# Patient Record
Sex: MALE | Race: BLACK OR AFRICAN AMERICAN | NOT HISPANIC OR LATINO | Employment: OTHER
[De-identification: names, ages, dates, MRNs, and addresses within clinical notes are randomized per-mention and may not be internally consistent; named-entity substitution may affect disease eponyms.]

---

## 2021-11-09 ENCOUNTER — TRANSCRIBE ORDERS (OUTPATIENT)
Age: 61
End: 2021-11-09

## 2021-11-09 DIAGNOSIS — G47.33 SEVERE OBSTRUCTIVE SLEEP APNEA: Primary | ICD-10-CM

## 2023-02-20 PROBLEM — I35.1 SEVERE AORTIC REGURGITATION: Status: ACTIVE | Noted: 2023-02-20

## 2023-02-20 PROBLEM — E11.9 TYPE 2 DIABETES MELLITUS WITHOUT COMPLICATION, WITHOUT LONG-TERM CURRENT USE OF INSULIN (MULTI): Status: ACTIVE | Noted: 2023-02-20

## 2023-02-20 PROBLEM — E78.5 HYPERLIPIDEMIA: Status: ACTIVE | Noted: 2023-02-20

## 2023-02-20 PROBLEM — I10 HTN (HYPERTENSION), BENIGN: Status: ACTIVE | Noted: 2023-02-20

## 2023-02-20 PROBLEM — R14.2 BELCHING: Status: ACTIVE | Noted: 2023-02-20

## 2023-02-20 PROBLEM — Z95.2 S/P AORTIC VALVE REPLACEMENT: Status: ACTIVE | Noted: 2023-02-20

## 2023-02-20 PROBLEM — E66.01 MORBID OBESITY (MULTI): Status: ACTIVE | Noted: 2023-02-20

## 2023-02-20 PROBLEM — G47.33 SEVERE OBSTRUCTIVE SLEEP APNEA: Status: ACTIVE | Noted: 2023-02-20

## 2023-02-20 RX ORDER — ASPIRIN 81 MG/1
1 TABLET ORAL DAILY
COMMUNITY

## 2023-02-20 RX ORDER — ACETAMINOPHEN 325 MG/1
325 TABLET ORAL EVERY 6 HOURS PRN
COMMUNITY
Start: 2021-03-12

## 2023-02-20 RX ORDER — METOPROLOL TARTRATE 25 MG/1
1 TABLET, FILM COATED ORAL 2 TIMES DAILY
COMMUNITY
Start: 2021-03-12 | End: 2023-04-05 | Stop reason: ALTCHOICE

## 2023-02-20 RX ORDER — MULTIVIT-MIN/FA/LYCOPEN/LUTEIN .4-300-25
1 TABLET ORAL DAILY
COMMUNITY
Start: 2021-08-10

## 2023-02-20 RX ORDER — CHOLECALCIFEROL (VITAMIN D3) 50 MCG
1 TABLET ORAL DAILY
COMMUNITY

## 2023-02-20 RX ORDER — METFORMIN HYDROCHLORIDE 500 MG/1
1 TABLET ORAL DAILY
COMMUNITY
Start: 2022-07-06 | End: 2023-05-23 | Stop reason: SDUPTHER

## 2023-02-20 RX ORDER — ATORVASTATIN CALCIUM 10 MG/1
1 TABLET, FILM COATED ORAL DAILY
COMMUNITY
Start: 2022-07-06 | End: 2023-04-05 | Stop reason: SDUPTHER

## 2023-03-09 LAB
ALANINE AMINOTRANSFERASE (SGPT) (U/L) IN SER/PLAS: 41 U/L (ref 10–52)
ALBUMIN (G/DL) IN SER/PLAS: 4.5 G/DL (ref 3.4–5)
ALKALINE PHOSPHATASE (U/L) IN SER/PLAS: 92 U/L (ref 33–136)
ANION GAP IN SER/PLAS: 14 MMOL/L (ref 10–20)
ASPARTATE AMINOTRANSFERASE (SGOT) (U/L) IN SER/PLAS: 30 U/L (ref 9–39)
BILIRUBIN TOTAL (MG/DL) IN SER/PLAS: 0.7 MG/DL (ref 0–1.2)
CALCIUM (MG/DL) IN SER/PLAS: 9.6 MG/DL (ref 8.6–10.6)
CARBON DIOXIDE, TOTAL (MMOL/L) IN SER/PLAS: 26 MMOL/L (ref 21–32)
CHLORIDE (MMOL/L) IN SER/PLAS: 105 MMOL/L (ref 98–107)
CREATININE (MG/DL) IN SER/PLAS: 1.22 MG/DL (ref 0.5–1.3)
GFR MALE: 68 ML/MIN/1.73M2
GLUCOSE (MG/DL) IN SER/PLAS: 164 MG/DL (ref 74–99)
POTASSIUM (MMOL/L) IN SER/PLAS: 4.6 MMOL/L (ref 3.5–5.3)
PROTEIN TOTAL: 7.1 G/DL (ref 6.4–8.2)
SODIUM (MMOL/L) IN SER/PLAS: 140 MMOL/L (ref 136–145)
UREA NITROGEN (MG/DL) IN SER/PLAS: 15 MG/DL (ref 6–23)

## 2023-03-22 ENCOUNTER — OFFICE VISIT (OUTPATIENT)
Dept: PRIMARY CARE | Facility: CLINIC | Age: 63
End: 2023-03-22
Payer: COMMERCIAL

## 2023-03-22 VITALS
DIASTOLIC BLOOD PRESSURE: 76 MMHG | HEART RATE: 70 BPM | TEMPERATURE: 98.6 F | OXYGEN SATURATION: 96 % | BODY MASS INDEX: 35.77 KG/M2 | RESPIRATION RATE: 16 BRPM | WEIGHT: 236 LBS | HEIGHT: 68 IN | SYSTOLIC BLOOD PRESSURE: 132 MMHG

## 2023-03-22 DIAGNOSIS — B07.9 WART OF HAND: Primary | ICD-10-CM

## 2023-03-22 PROCEDURE — 1036F TOBACCO NON-USER: CPT | Performed by: FAMILY MEDICINE

## 2023-03-22 PROCEDURE — 3078F DIAST BP <80 MM HG: CPT | Performed by: FAMILY MEDICINE

## 2023-03-22 PROCEDURE — 17110 DESTRUCTION B9 LES UP TO 14: CPT | Performed by: FAMILY MEDICINE

## 2023-03-22 PROCEDURE — 3044F HG A1C LEVEL LT 7.0%: CPT | Performed by: FAMILY MEDICINE

## 2023-03-22 PROCEDURE — 3075F SYST BP GE 130 - 139MM HG: CPT | Performed by: FAMILY MEDICINE

## 2023-03-22 ASSESSMENT — PATIENT HEALTH QUESTIONNAIRE - PHQ9
1. LITTLE INTEREST OR PLEASURE IN DOING THINGS: NOT AT ALL
2. FEELING DOWN, DEPRESSED OR HOPELESS: NOT AT ALL
SUM OF ALL RESPONSES TO PHQ9 QUESTIONS 1 AND 2: 0

## 2023-03-22 ASSESSMENT — PAIN SCALES - GENERAL: PAINLEVEL: 0-NO PAIN

## 2023-03-22 NOTE — PROGRESS NOTES
"Patient ID: Germain Pichardo is a 62 y.o. male.    Destruction of lesion    Date/Time: 3/22/2023 12:42 PM    Performed by: Lindsey Rock Pla, DO  Authorized by: Lindsey Rock Pla, DO    Number of Lesions: 3  Lesion 1:     Body area: upper extremity    Upper extremity location: L index finger    Malignancy: benign lesion      Destruction method: cryotherapy    Lesion 2:     Body area: upper extremity    Upper extremity location: L index finger    Destruction method: cryotherapy    Lesion 3:     Body area: upper extremity    Upper extremity location: L index finger    Destruction method: cryotherapy    Subjective   Patient ID: Germain Pichardo is a 62 y.o. male who presents for warts  (LT hand forefinger. OTC products not helping, ready for them to go away.).    Here for wart removal.    Has a wart on his left hand    Has had for about 8 months  Was able to get rid on his right hand with OTC meds   Still on left hand 1st digit   Not painful  Annoying    3 small lesions       Review of Systems    Objective   /76   Pulse 70   Temp 37 °C (98.6 °F)   Resp 16   Ht 1.715 m (5' 7.5\")   Wt 107 kg (236 lb)   SpO2 96%   BMI 36.42 kg/m²     Physical Exam    Assessment/Plan   Problem List Items Addressed This Visit          Musculoskeletal    Wart of hand - Primary   Warts on left hand  Cryotherapy to 3 lesions.  3 cycles completed to affected area.  Patient tolerated procedure well  Postprocedure care reviewed.  Recheck in 2 weeks at follow-up visit       "

## 2023-03-22 NOTE — PATIENT INSTRUCTIONS
Warts on left hand  Cryotherapy to 3 lesions.  3 cycles completed to affected area.  Patient tolerated procedure well  Postprocedure care reviewed.  Recheck in 2 weeks at follow-up visit

## 2023-04-05 ENCOUNTER — OFFICE VISIT (OUTPATIENT)
Dept: PRIMARY CARE | Facility: CLINIC | Age: 63
End: 2023-04-05
Payer: COMMERCIAL

## 2023-04-05 VITALS
HEIGHT: 68 IN | SYSTOLIC BLOOD PRESSURE: 156 MMHG | TEMPERATURE: 98.7 F | BODY MASS INDEX: 35.77 KG/M2 | WEIGHT: 236 LBS | RESPIRATION RATE: 16 BRPM | HEART RATE: 69 BPM | OXYGEN SATURATION: 95 % | DIASTOLIC BLOOD PRESSURE: 96 MMHG

## 2023-04-05 DIAGNOSIS — E66.01 MORBID OBESITY (MULTI): ICD-10-CM

## 2023-04-05 DIAGNOSIS — G47.33 SEVERE OBSTRUCTIVE SLEEP APNEA: ICD-10-CM

## 2023-04-05 DIAGNOSIS — I10 HTN (HYPERTENSION), BENIGN: ICD-10-CM

## 2023-04-05 DIAGNOSIS — K21.9 GASTROESOPHAGEAL REFLUX DISEASE WITHOUT ESOPHAGITIS: ICD-10-CM

## 2023-04-05 DIAGNOSIS — E11.9 TYPE 2 DIABETES MELLITUS WITHOUT COMPLICATION, WITHOUT LONG-TERM CURRENT USE OF INSULIN (MULTI): Primary | ICD-10-CM

## 2023-04-05 DIAGNOSIS — E78.2 MIXED HYPERLIPIDEMIA: ICD-10-CM

## 2023-04-05 PROBLEM — Z00.00 ENCOUNTER FOR ANNUAL PHYSICAL EXAM: Status: ACTIVE | Noted: 2023-04-05

## 2023-04-05 PROBLEM — R14.2 BELCHING: Status: RESOLVED | Noted: 2023-02-20 | Resolved: 2023-04-05

## 2023-04-05 PROCEDURE — 1036F TOBACCO NON-USER: CPT | Performed by: FAMILY MEDICINE

## 2023-04-05 PROCEDURE — 3077F SYST BP >= 140 MM HG: CPT | Performed by: FAMILY MEDICINE

## 2023-04-05 PROCEDURE — 3044F HG A1C LEVEL LT 7.0%: CPT | Performed by: FAMILY MEDICINE

## 2023-04-05 PROCEDURE — 4010F ACE/ARB THERAPY RXD/TAKEN: CPT | Performed by: FAMILY MEDICINE

## 2023-04-05 PROCEDURE — 99214 OFFICE O/P EST MOD 30 MIN: CPT | Performed by: FAMILY MEDICINE

## 2023-04-05 PROCEDURE — 3080F DIAST BP >= 90 MM HG: CPT | Performed by: FAMILY MEDICINE

## 2023-04-05 RX ORDER — ACYCLOVIR 400 MG/1
400 TABLET ORAL
COMMUNITY
Start: 2023-02-17 | End: 2023-04-05

## 2023-04-05 RX ORDER — METOPROLOL TARTRATE 50 MG/1
50 TABLET ORAL 2 TIMES DAILY
Qty: 180 TABLET | Refills: 1 | Status: SHIPPED | OUTPATIENT
Start: 2023-04-05 | End: 2023-10-06 | Stop reason: SDUPTHER

## 2023-04-05 RX ORDER — METOPROLOL SUCCINATE 50 MG/1
50 TABLET, EXTENDED RELEASE ORAL DAILY
Qty: 30 TABLET | Refills: 5 | Status: SHIPPED | OUTPATIENT
Start: 2023-04-05 | End: 2023-10-06 | Stop reason: SDUPTHER

## 2023-04-05 RX ORDER — ATORVASTATIN CALCIUM 10 MG/1
10 TABLET, FILM COATED ORAL DAILY
Qty: 90 TABLET | Refills: 1 | Status: SHIPPED | OUTPATIENT
Start: 2023-04-05 | End: 2023-10-06 | Stop reason: SDUPTHER

## 2023-04-05 RX ORDER — LOSARTAN POTASSIUM 50 MG/1
50 TABLET ORAL DAILY
COMMUNITY
Start: 2023-02-01 | End: 2023-04-05 | Stop reason: ALTCHOICE

## 2023-04-05 RX ORDER — LOSARTAN POTASSIUM 100 MG/1
100 TABLET ORAL DAILY
COMMUNITY
Start: 2023-02-17 | End: 2023-08-21 | Stop reason: SDUPTHER

## 2023-04-05 RX ORDER — CLOBETASOL PROPIONATE 0.5 MG/G
1 OINTMENT TOPICAL 2 TIMES DAILY
COMMUNITY
Start: 2023-02-17

## 2023-04-05 RX ORDER — FLUTICASONE PROPIONATE 50 MCG
2 SPRAY, SUSPENSION (ML) NASAL DAILY
COMMUNITY
Start: 2015-10-26 | End: 2023-04-05 | Stop reason: ALTCHOICE

## 2023-04-05 RX ORDER — OMEPRAZOLE 40 MG/1
40 CAPSULE, DELAYED RELEASE ORAL DAILY
Qty: 90 CAPSULE | Refills: 1 | Status: SHIPPED | OUTPATIENT
Start: 2023-04-05 | End: 2023-10-06 | Stop reason: ALTCHOICE

## 2023-04-05 RX ORDER — ASPIRIN 81 MG/1
81 TABLET ORAL ONCE
COMMUNITY
Start: 2021-03-16 | End: 2023-04-05 | Stop reason: ALTCHOICE

## 2023-04-05 RX ORDER — OMEPRAZOLE 40 MG/1
40 CAPSULE, DELAYED RELEASE ORAL DAILY
COMMUNITY
Start: 2023-01-05 | End: 2023-04-05 | Stop reason: SDUPTHER

## 2023-04-05 ASSESSMENT — PAIN SCALES - GENERAL: PAINLEVEL: 0-NO PAIN

## 2023-04-05 NOTE — ASSESSMENT & PLAN NOTE
Stable  Checking hemoglobin A1c  Eye exam up-to-date.  Urine albumin up-to-date.  Continue diet changes.  Recheck in 6 months

## 2023-04-05 NOTE — ASSESSMENT & PLAN NOTE
Stable  Trial of holding medication.  Reviewed diet changes.  Consider EGD if symptoms worsen or persist.  Recheck in 6 months

## 2023-04-05 NOTE — ASSESSMENT & PLAN NOTE
Uncontrolled  Diastolic elevated.  Increase metoprolol to 50 mg twice daily.  Continue losartan.  Continue to limit salt in diet.  Continue weight loss efforts.  Continue regular physical activity.  Recheck in 6 months

## 2023-04-05 NOTE — PROGRESS NOTES
"Subjective   Patient ID: Germain Pichardo is a 62 y.o. male who presents for 3 month fr/up.    Here for general check and med refill.    Home BP checks 120's/80's  Diet is fair  Watching salt in diet  Trys to limit sugar and fat  Lives alone and cooks himself  Limits fried foods  Bakes all meats  Exercise, walking daily  Denies CP sob lightheaded or dizzy    Taking medications daily for hypertension hyperlipidemia diabetes and GERD.    Recent labs showing blood sugar 164.    Cholesterol completed in January with total cholesterol 114, HDL 37, LDL 49, triglyceride 136  PSA normal.  Colonoscopy completed 5/20/2022    Seeing cardiology once a year  Eye exam a few weeks ago, new glasses         Review of Systems    Objective   BP (!) 156/96   Pulse 69   Temp 37.1 °C (98.7 °F)   Resp 16   Ht 1.727 m (5' 8\")   Wt 107 kg (236 lb)   SpO2 95%   BMI 35.88 kg/m²     Physical Exam  Vitals and nursing note reviewed.   Constitutional:       Appearance: Normal appearance.   Cardiovascular:      Rate and Rhythm: Normal rate and regular rhythm.   Pulmonary:      Effort: Pulmonary effort is normal.      Breath sounds: Normal breath sounds.   Musculoskeletal:      Cervical back: Normal range of motion.   Neurological:      Mental Status: He is alert.   Psychiatric:         Mood and Affect: Mood normal.         Behavior: Behavior normal.         Thought Content: Thought content normal.         Judgment: Judgment normal.         Assessment/Plan   Problem List Items Addressed This Visit          Nervous    Severe obstructive sleep apnea     Stable  Continue CPAP            Circulatory    HTN (hypertension), benign     Uncontrolled  Diastolic elevated.  Increase metoprolol to 50 mg twice daily.  Continue losartan.  Continue to limit salt in diet.  Continue weight loss efforts.  Continue regular physical activity.  Recheck in 6 months         Relevant Medications    metoprolol succinate XL (Toprol-XL) 50 mg 24 hr tablet    metoprolol " tartrate (Lopressor) 50 mg tablet    Other Relevant Orders    CBC       Digestive    Gastroesophageal reflux disease without esophagitis     Stable  Trial of holding medication.  Reviewed diet changes.  Consider EGD if symptoms worsen or persist.  Recheck in 6 months         Relevant Medications    omeprazole (PriLOSEC) 40 mg DR capsule       Endocrine/Metabolic    Type 2 diabetes mellitus without complication, without long-term current use of insulin (CMS/[facility]) - Primary     Stable  Checking hemoglobin A1c  Eye exam up-to-date.  Urine albumin up-to-date.  Continue diet changes.  Recheck in 6 months         Relevant Orders    Hemoglobin A1C    Morbid obesity (CMS/HCC)     Uncontrolled  Continue weight loss efforts.  Reviewed diet changes.  Portion control.  Recheck in 6 months            Other    Hyperlipidemia     Stable  Cholesterol numbers at goal.  Refilling meds at same dose.  Recheck in 6 months           Relevant Medications    atorvastatin (Lipitor) 10 mg tablet    Other Relevant Orders    Lipid Panel

## 2023-04-05 NOTE — ASSESSMENT & PLAN NOTE
Uncontrolled  Continue weight loss efforts.  Reviewed diet changes.  Portion control.  Recheck in 6 months

## 2023-05-17 ENCOUNTER — LAB (OUTPATIENT)
Dept: LAB | Facility: LAB | Age: 63
End: 2023-05-17
Payer: COMMERCIAL

## 2023-05-17 DIAGNOSIS — I10 HTN (HYPERTENSION), BENIGN: ICD-10-CM

## 2023-05-17 DIAGNOSIS — E78.2 MIXED HYPERLIPIDEMIA: ICD-10-CM

## 2023-05-17 DIAGNOSIS — E11.9 TYPE 2 DIABETES MELLITUS WITHOUT COMPLICATION, WITHOUT LONG-TERM CURRENT USE OF INSULIN (MULTI): ICD-10-CM

## 2023-05-17 LAB
CHOLESTEROL (MG/DL) IN SER/PLAS: 99 MG/DL (ref 0–199)
CHOLESTEROL IN HDL (MG/DL) IN SER/PLAS: 33.1 MG/DL
CHOLESTEROL/HDL RATIO: 3
ERYTHROCYTE DISTRIBUTION WIDTH (RATIO) BY AUTOMATED COUNT: 15.8 % (ref 11.5–14.5)
ERYTHROCYTE MEAN CORPUSCULAR HEMOGLOBIN CONCENTRATION (G/DL) BY AUTOMATED: 32.2 G/DL (ref 32–36)
ERYTHROCYTE MEAN CORPUSCULAR VOLUME (FL) BY AUTOMATED COUNT: 73 FL (ref 80–100)
ERYTHROCYTES (10*6/UL) IN BLOOD BY AUTOMATED COUNT: 6.36 X10E12/L (ref 4.5–5.9)
ESTIMATED AVERAGE GLUCOSE FOR HBA1C: 148 MG/DL
HEMATOCRIT (%) IN BLOOD BY AUTOMATED COUNT: 46.6 % (ref 41–52)
HEMOGLOBIN (G/DL) IN BLOOD: 15 G/DL (ref 13.5–17.5)
HEMOGLOBIN A1C/HEMOGLOBIN TOTAL IN BLOOD: 6.8 %
LDL: 43 MG/DL (ref 0–99)
LEUKOCYTES (10*3/UL) IN BLOOD BY AUTOMATED COUNT: 5.3 X10E9/L (ref 4.4–11.3)
NRBC (PER 100 WBCS) BY AUTOMATED COUNT: 0 /100 WBC (ref 0–0)
PLATELETS (10*3/UL) IN BLOOD AUTOMATED COUNT: 203 X10E9/L (ref 150–450)
TRIGLYCERIDE (MG/DL) IN SER/PLAS: 116 MG/DL (ref 0–149)
VLDL: 23 MG/DL (ref 0–40)

## 2023-05-17 PROCEDURE — 80061 LIPID PANEL: CPT

## 2023-05-17 PROCEDURE — 85027 COMPLETE CBC AUTOMATED: CPT

## 2023-05-17 PROCEDURE — 36415 COLL VENOUS BLD VENIPUNCTURE: CPT

## 2023-05-17 PROCEDURE — 83036 HEMOGLOBIN GLYCOSYLATED A1C: CPT

## 2023-05-23 DIAGNOSIS — E11.9 TYPE 2 DIABETES MELLITUS WITHOUT COMPLICATION, WITHOUT LONG-TERM CURRENT USE OF INSULIN (MULTI): Primary | ICD-10-CM

## 2023-05-23 RX ORDER — METFORMIN HYDROCHLORIDE 500 MG/1
500 TABLET ORAL
Qty: 180 TABLET | Refills: 0 | Status: SHIPPED | OUTPATIENT
Start: 2023-05-23 | End: 2023-09-01 | Stop reason: SDUPTHER

## 2023-08-21 DIAGNOSIS — I10 HTN (HYPERTENSION), BENIGN: Primary | ICD-10-CM

## 2023-08-21 RX ORDER — LOSARTAN POTASSIUM 100 MG/1
100 TABLET ORAL DAILY
Qty: 90 TABLET | Refills: 0 | Status: SHIPPED | OUTPATIENT
Start: 2023-08-21 | End: 2023-10-06 | Stop reason: SDUPTHER

## 2023-09-01 DIAGNOSIS — E11.9 TYPE 2 DIABETES MELLITUS WITHOUT COMPLICATION, WITHOUT LONG-TERM CURRENT USE OF INSULIN (MULTI): ICD-10-CM

## 2023-09-01 RX ORDER — METFORMIN HYDROCHLORIDE 500 MG/1
500 TABLET ORAL
Qty: 180 TABLET | Refills: 0 | Status: SHIPPED | OUTPATIENT
Start: 2023-09-01 | End: 2023-12-12 | Stop reason: SDUPTHER

## 2023-10-03 ENCOUNTER — TELEPHONE (OUTPATIENT)
Dept: PRIMARY CARE | Facility: CLINIC | Age: 63
End: 2023-10-03
Payer: MEDICARE

## 2023-10-03 NOTE — TELEPHONE ENCOUNTER
Pt lvm to reschedule due to covid positive. Symptoms started on 22nd and tested on the 30th after the cruise.     Appt changed

## 2023-10-06 ENCOUNTER — TELEMEDICINE (OUTPATIENT)
Dept: PRIMARY CARE | Facility: CLINIC | Age: 63
End: 2023-10-06
Payer: MEDICARE

## 2023-10-06 VITALS
BODY MASS INDEX: 33.72 KG/M2 | DIASTOLIC BLOOD PRESSURE: 90 MMHG | SYSTOLIC BLOOD PRESSURE: 122 MMHG | WEIGHT: 221.8 LBS | TEMPERATURE: 97.6 F

## 2023-10-06 DIAGNOSIS — E78.2 MIXED HYPERLIPIDEMIA: ICD-10-CM

## 2023-10-06 DIAGNOSIS — G47.33 SEVERE OBSTRUCTIVE SLEEP APNEA: ICD-10-CM

## 2023-10-06 DIAGNOSIS — E66.01 MORBID OBESITY (MULTI): ICD-10-CM

## 2023-10-06 DIAGNOSIS — Z95.2 S/P AORTIC VALVE REPLACEMENT: ICD-10-CM

## 2023-10-06 DIAGNOSIS — U07.1 COVID-19 VIRUS INFECTION: ICD-10-CM

## 2023-10-06 DIAGNOSIS — I50.22 CHRONIC SYSTOLIC (CONGESTIVE) HEART FAILURE (MULTI): ICD-10-CM

## 2023-10-06 DIAGNOSIS — I10 HTN (HYPERTENSION), BENIGN: Primary | ICD-10-CM

## 2023-10-06 DIAGNOSIS — I48.0 PAROXYSMAL ATRIAL FIBRILLATION (MULTI): ICD-10-CM

## 2023-10-06 DIAGNOSIS — K21.9 GASTROESOPHAGEAL REFLUX DISEASE WITHOUT ESOPHAGITIS: ICD-10-CM

## 2023-10-06 DIAGNOSIS — E11.9 TYPE 2 DIABETES MELLITUS WITHOUT COMPLICATION, WITHOUT LONG-TERM CURRENT USE OF INSULIN (MULTI): ICD-10-CM

## 2023-10-06 PROCEDURE — 99214 OFFICE O/P EST MOD 30 MIN: CPT | Performed by: FAMILY MEDICINE

## 2023-10-06 RX ORDER — ATORVASTATIN CALCIUM 10 MG/1
10 TABLET, FILM COATED ORAL DAILY
Qty: 90 TABLET | Refills: 1 | Status: SHIPPED | OUTPATIENT
Start: 2023-10-06 | End: 2024-02-20 | Stop reason: SDUPTHER

## 2023-10-06 RX ORDER — LOSARTAN POTASSIUM 100 MG/1
100 TABLET ORAL DAILY
Qty: 90 TABLET | Refills: 1 | Status: SHIPPED | OUTPATIENT
Start: 2023-10-06 | End: 2024-05-16 | Stop reason: SDUPTHER

## 2023-10-06 RX ORDER — METOPROLOL TARTRATE 50 MG/1
50 TABLET ORAL 2 TIMES DAILY
Qty: 180 TABLET | Refills: 1 | Status: SHIPPED | OUTPATIENT
Start: 2023-10-06 | End: 2024-05-16 | Stop reason: SDUPTHER

## 2023-10-06 ASSESSMENT — PATIENT HEALTH QUESTIONNAIRE - PHQ9
1. LITTLE INTEREST OR PLEASURE IN DOING THINGS: NOT AT ALL
SUM OF ALL RESPONSES TO PHQ9 QUESTIONS 1 AND 2: 0
2. FEELING DOWN, DEPRESSED OR HOPELESS: NOT AT ALL

## 2023-10-06 NOTE — ASSESSMENT & PLAN NOTE
Checking A1c level and adjust meds accordingly.  Eye exam up-to-date.  Urine albumin up-to-date.  Continue weight loss efforts.  Continue diet changes.  Recheck in 6 months

## 2023-10-06 NOTE — PROGRESS NOTES
Subjective   Patient ID: Germain Pichardo is a 62 y.o. male who presents for Follow-up (6 mth check/ Covid + 9/29).    [place] [facility] for 6 month general check    Tested positive for COVID on September 30th  Retested yesterday and still positive  Feeling better  Felt like a bad cold, shivers on occasion  Still slight fatique    Taking meds daily for DM, HTN, Hyperlipids and GERD  Hx of severe aortic regurgitation status post aortic valve replacement.  Has severe sleep apnea using CPAP nightly    Last labs showed slight increase in A1C numbers  Cholesterol at goal  Diet has been better. healthy    Taking metformin 500mg bid  BS have been good 1 teens - 120's  Bp checks have been great as well.    120's/80's-90's  Denies chest pain, shortness of breath, lightheaded, dizziness or headaches.   Exercise regular.   Sleeping well.    Stopped omeprazole and burping has slowed down.              Review of Systems    Objective   /90   Temp 36.4 °C (97.6 °F) Comment: Per pt  Wt 101 kg (221 lb 12.8 oz) Comment: per pt  BMI 33.72 kg/m²     Physical Exam  Constitutional:       Appearance: Normal appearance.   Pulmonary:      Effort: Pulmonary effort is normal.   Neurological:      Mental Status: He is alert.   Psychiatric:         Mood and Affect: Mood normal.         Thought Content: Thought content normal.         Judgment: Judgment normal.         Assessment/Plan   Problem List Items Addressed This Visit             ICD-10-CM    Type 2 diabetes mellitus without complication, without long-term current use of insulin (CMS/[facility]) E11.9     Checking A1c level and adjust meds accordingly.  Eye exam up-to-date.  Urine albumin up-to-date.  Continue weight loss efforts.  Continue diet changes.  Recheck in 6 months         Relevant Orders    Hemoglobin A1C    Severe obstructive sleep apnea G47.33     Stable with nightly use of CPAP.  Continue.  Continue to monitor         S/P aortic valve replacement Z95.2     Stable  Continue present  meds.  Continue weight loss efforts.  Recheck in 6 months         HTN (hypertension), benign - Primary I10     Stable per patient.  Reviewed BP numbers.  Continue weight loss efforts.  Recheck in 6 months         Relevant Medications    metoprolol tartrate (Lopressor) 50 mg tablet    losartan (Cozaar) 100 mg tablet    Other Relevant Orders    CBC and Auto Differential    Hyperlipidemia E78.5     Stable  Reviewed diet.  Checking fasting labs and adjust meds accordingly         Relevant Medications    atorvastatin (Lipitor) 10 mg tablet    Other Relevant Orders    Comprehensive Metabolic Panel    Lipid Panel    Morbid obesity (CMS/[facility]) E66.01     Stable  Continue diet changes.  Recheck in 6 months         Gastroesophageal reflux disease without esophagitis K21.9     Stable without meds.  Trial of OTC probiotic  Continue to monitor         Chronic systolic (congestive) heart failure (CMS/[facility]) I50.22     Stable and asymptomatic.  Continue care per cardiology         Relevant Medications    metoprolol tartrate (Lopressor) 50 mg tablet    Paroxysmal atrial fibrillation (CMS/HCC) I48.0     Stable  Continue care per cardiology  Continue to monitor         Relevant Medications    metoprolol tartrate (Lopressor) 50 mg tablet     Other Visit Diagnoses         Codes    COVID-19 virus infection     U07.1    Improving.  Continue masking while out.  Continue OTC meds.  Return if symptoms recur

## 2023-11-01 ENCOUNTER — CLINICAL SUPPORT (OUTPATIENT)
Dept: SLEEP MEDICINE | Facility: HOSPITAL | Age: 63
End: 2023-11-01
Payer: COMMERCIAL

## 2023-11-01 DIAGNOSIS — G47.33 SEVERE OBSTRUCTIVE SLEEP APNEA: ICD-10-CM

## 2023-12-12 DIAGNOSIS — E11.9 TYPE 2 DIABETES MELLITUS WITHOUT COMPLICATION, WITHOUT LONG-TERM CURRENT USE OF INSULIN (MULTI): ICD-10-CM

## 2023-12-12 RX ORDER — METFORMIN HYDROCHLORIDE 500 MG/1
500 TABLET ORAL
Qty: 180 TABLET | Refills: 0 | Status: SHIPPED | OUTPATIENT
Start: 2023-12-12 | End: 2024-02-15 | Stop reason: SDUPTHER

## 2023-12-12 NOTE — TELEPHONE ENCOUNTER
Patient called in and needs a refill on the follow medication    Metformin 500mg 2 times daily  Karin earl  Patient has zero left

## 2024-02-15 ENCOUNTER — HOSPITAL ENCOUNTER (OUTPATIENT)
Dept: CARDIOLOGY | Facility: CLINIC | Age: 64
Discharge: HOME | End: 2024-02-15
Payer: MEDICARE

## 2024-02-15 ENCOUNTER — OFFICE VISIT (OUTPATIENT)
Dept: CARDIOLOGY | Facility: CLINIC | Age: 64
End: 2024-02-15
Payer: MEDICARE

## 2024-02-15 VITALS
BODY MASS INDEX: 35.71 KG/M2 | HEART RATE: 73 BPM | HEIGHT: 68 IN | WEIGHT: 235.6 LBS | SYSTOLIC BLOOD PRESSURE: 122 MMHG | DIASTOLIC BLOOD PRESSURE: 76 MMHG

## 2024-02-15 DIAGNOSIS — Z95.5 PRESENCE OF CORONARY ANGIOPLASTY IMPLANT AND GRAFT: ICD-10-CM

## 2024-02-15 DIAGNOSIS — Z95.2 S/P AORTIC VALVE REPLACEMENT: ICD-10-CM

## 2024-02-15 DIAGNOSIS — Z98.890 H/O TRICUSPID VALVE REPAIR: ICD-10-CM

## 2024-02-15 DIAGNOSIS — I50.22 CHRONIC SYSTOLIC (CONGESTIVE) HEART FAILURE (MULTI): ICD-10-CM

## 2024-02-15 DIAGNOSIS — E11.9 TYPE 2 DIABETES MELLITUS WITHOUT COMPLICATION, WITHOUT LONG-TERM CURRENT USE OF INSULIN (MULTI): ICD-10-CM

## 2024-02-15 DIAGNOSIS — I42.9 CARDIOMYOPATHY, UNSPECIFIED TYPE (MULTI): Primary | ICD-10-CM

## 2024-02-15 DIAGNOSIS — I35.1 AORTIC VALVE INSUFFICIENCY, ETIOLOGY OF CARDIAC VALVE DISEASE UNSPECIFIED: ICD-10-CM

## 2024-02-15 DIAGNOSIS — I10 HTN (HYPERTENSION), BENIGN: ICD-10-CM

## 2024-02-15 LAB
AORTIC VALVE MEAN GRADIENT: 3.6 MMHG
AORTIC VALVE PEAK VELOCITY: 1.32 M/S
AV PEAK GRADIENT: 7 MMHG
EJECTION FRACTION APICAL 4 CHAMBER: 53.6
EJECTION FRACTION: 56 %
LEFT ATRIUM VOLUME AREA LENGTH INDEX BSA: 31.9 ML/M2
LEFT VENTRICLE INTERNAL DIMENSION DIASTOLE: 5.52 CM (ref 3.5–6)
MITRAL VALVE E/A RATIO: 1.21
MITRAL VALVE E/E' RATIO: 8.67
RIGHT VENTRICLE FREE WALL PEAK S': 7 CM/S
RIGHT VENTRICLE PEAK SYSTOLIC PRESSURE: 20.5 MMHG
TRICUSPID ANNULAR PLANE SYSTOLIC EXCURSION: 2 CM

## 2024-02-15 PROCEDURE — 3078F DIAST BP <80 MM HG: CPT | Performed by: NURSE PRACTITIONER

## 2024-02-15 PROCEDURE — 93306 TTE W/DOPPLER COMPLETE: CPT

## 2024-02-15 PROCEDURE — 99215 OFFICE O/P EST HI 40 MIN: CPT | Performed by: NURSE PRACTITIONER

## 2024-02-15 PROCEDURE — 1036F TOBACCO NON-USER: CPT | Performed by: NURSE PRACTITIONER

## 2024-02-15 PROCEDURE — 3074F SYST BP LT 130 MM HG: CPT | Performed by: NURSE PRACTITIONER

## 2024-02-15 PROCEDURE — 93306 TTE W/DOPPLER COMPLETE: CPT | Performed by: INTERNAL MEDICINE

## 2024-02-15 PROCEDURE — 2500000004 HC RX 250 GENERAL PHARMACY W/ HCPCS (ALT 636 FOR OP/ED): Performed by: INTERNAL MEDICINE

## 2024-02-15 RX ORDER — METFORMIN HYDROCHLORIDE 500 MG/1
500 TABLET ORAL
Qty: 90 TABLET | Refills: 0
Start: 2024-02-15 | End: 2024-03-22 | Stop reason: SDUPTHER

## 2024-02-15 RX ADMIN — PERFLUTREN 1 ML OF DILUTION: 6.52 INJECTION, SUSPENSION INTRAVENOUS at 09:19

## 2024-02-15 NOTE — PROGRESS NOTES
"Chief Complaint:   AVR and TV repair     History Of Present Illness:    Germain Picharod is a 63 y.o. male here with aortic valve disease s/p bioprosthetic aortic valve replacement. Underwent tricuspid valve repair as well. The patient has been well since their last appointment and has no cardiac complaints.  The patient denies chest pain, shortness of breath, or any systemic symptoms.  The patient is compliant with aspirin and antibiotic prophylaxis to prevent endocarditis.  Their most recent echocardiogram demonstrates normal prosthetic valve function.      Exercises 2-3 times per day without complaint    He does report gas build up and have to burp it out. When walking in cold weather pressure will build up in check, once burp then it resolves and does not reoccur.     Denies SOB, palpitations, dizziness, lightheadedness.     Went on a cruise, came back with covid. That was this past fall. Was not very ill. Felt like a cold.     CV Surgery (AV replacement with 27mm Freestyle. Tricuspid valve repair with 36mm ring.) - 3/9/2021  Echo (EF 0.40 (40%), Severe AR) - 3/4/2021    Allergies:  Acyclovir, Cocoa, Lisinopril, and Raw vegetable    Review of Systems  All pertinent systems have been reviewed and are negative except for what is stated in the history of present illness.    All other systems have been reviewed and are negative and noncontributory to this patient's current ailments.     Visit Vitals  /76 (BP Location: Right arm, Patient Position: Sitting, BP Cuff Size: Large adult)   Pulse 73   Ht 1.727 m (5' 8\")   Wt 107 kg (235 lb 9.6 oz)   BMI 35.82 kg/m²   Smoking Status Never   BSA 2.27 m²         Last Labs:  CBC -  Lab Results   Component Value Date    WBC 5.3 05/17/2023    HGB 15.0 05/17/2023    HCT 46.6 05/17/2023    MCV 73 (L) 05/17/2023     05/17/2023       CMP -  Lab Results   Component Value Date    CALCIUM 9.6 03/09/2023    PHOS 2.3 (L) 03/13/2021    PROT 7.1 03/09/2023    ALBUMIN 4.5 " 03/09/2023    AST 30 03/09/2023    ALT 41 03/09/2023    ALKPHOS 92 03/09/2023    BILITOT 0.7 03/09/2023       LIPID PANEL -   Lab Results   Component Value Date    CHOL 99 05/17/2023    TRIG 116 05/17/2023    HDL 33.1 (A) 05/17/2023    CHHDL 3.0 05/17/2023    LDLF 43 05/17/2023    VLDL 23 05/17/2023    NHDL 132 06/24/2022       RENAL FUNCTION PANEL -   Lab Results   Component Value Date    GLUCOSE 164 (H) 03/09/2023     03/09/2023    K 4.6 03/09/2023     03/09/2023    CO2 26 03/09/2023    ANIONGAP 14 03/09/2023    BUN 15 03/09/2023    CREATININE 1.22 03/09/2023    GFRMALE 68 03/09/2023    CALCIUM 9.6 03/09/2023    PHOS 2.3 (L) 03/13/2021    ALBUMIN 4.5 03/09/2023     Objective   Vitals reviewed.   Constitutional:       Appearance: Healthy appearance. Not in distress.   Neck:      Vascular: No JVR. JVD normal.   Pulmonary:      Effort: Pulmonary effort is normal.      Breath sounds: Normal breath sounds. No wheezing. No rhonchi. No rales.   Chest:      Chest wall: Not tender to palpatation.   Cardiovascular:      PMI at left midclavicular line. Normal rate. Regular rhythm. Normal S1. Normal S2.       Murmurs: There is no murmur.      No gallop.  No click. No rub.   Edema:     Peripheral edema absent.   Abdominal:      General: Bowel sounds are normal.      Palpations: Abdomen is soft.      Tenderness: There is no abdominal tenderness.   Musculoskeletal: Normal range of motion.         General: No tenderness. Skin:     General: Skin is warm and dry.   Neurological:      General: No focal deficit present.      Mental Status: Alert and oriented to person, place and time.       Assessment/Plan   Diagnoses and all orders for this visit:  Cardiomyopathy, unspecified type (CMS/HCC)  - EF reduced from last year. Not as severe as prior to valve replacement. Valves look good. Unclear why sudden decrease. Coronary CTA prior to valve 3 years ago was negative for cad; however, with newly reduced EF and chest pressure  with exertion, ?angina, will need to check coronary CTA.   - first starting empagliflozin (Jardiance) 10 mg; Take 1 tablet (10 mg) by mouth once daily. Get labs done in 1-2 weeks. Patient has his metformin increased to 2x in May. With the addition of dm med we will decrease it to once a day with close monitoring of glucose.   - will consider switching losartan to entresto based on coronary CTA  - thankfully he is euvolemic and relatively asymptomatic  Chronic systolic (congestive) heart failure (CMS/HCC)  - see above  S/P aortic valve replacement  - stable  - annual echo  - abx dental work  H/O tricuspid valve repair  - stable  - annual echo  - abx dental work  HTN (hypertension), benign  - well controlled  DM II  - see above       Current Outpatient Medications:     acetaminophen (Tylenol) 325 mg tablet, Take 1 tablet (325 mg) by mouth every 6 hours if needed for moderate pain (4 - 6)., Disp: , Rfl:     aspirin 81 mg EC tablet, Take 1 tablet (81 mg) by mouth once daily., Disp: , Rfl:     cholecalciferol (Vitamin D-3) 50 MCG (2000 UT) tablet, Take 1 tablet (2,000 Units) by mouth once daily., Disp: , Rfl:     clobetasol (Temovate) 0.05 % ointment, Apply 1 Application topically 2 times a day. Apply and gently massage topically to the affected area twice a day., Disp: , Rfl:     multivit-iron-minerals-folic acid (Centrum Silver) 0.4 mg-300 mcg- 250 mcg tab, Take 1 tablet by mouth once daily., Disp: , Rfl:     neomycin-bacitracnZn-polymyxnB 3.5-500-10,000 mg-unit-unit ointment, once daily., Disp: , Rfl:     atorvastatin (Lipitor) 10 mg tablet, Take 1 tablet (10 mg) by mouth once daily., Disp: 90 tablet, Rfl: 1    empagliflozin (Jardiance) 10 mg, Take 1 tablet (10 mg) by mouth once daily., Disp: 90 tablet, Rfl: 3    losartan (Cozaar) 100 mg tablet, Take 1 tablet (100 mg) by mouth once daily., Disp: 90 tablet, Rfl: 1    metFORMIN (Glucophage) 500 mg tablet, Take 1 tablet (500 mg) by mouth once daily with breakfast., Disp:  90 tablet, Rfl: 0    metoprolol tartrate (Lopressor) 50 mg tablet, Take 1 tablet by mouth 2 times a day., Disp: 180 tablet, Rfl: 1  No current facility-administered medications for this visit.    Exclusive of any other services or procedures performed, I, Marcia THOMAS, spent 30 minutes in duration for this visit today.  This time consisted of chart review, obtaining history, and/or performing the exam as documented above, as well as, documenting the clinical information for the encounter in the electronic record, discussing treatment options, plans, and/or goals with patient, family, and/or caregiver, refilling medications, updating the electronic record, ordering medicines, lab work, imaging, referrals, and/or procedures as documented above and communicating with other [facility] professionals. I have discussed the results of laboratory, radiology, and cardiology studies with the patient and their family/caregiver.

## 2024-02-20 DIAGNOSIS — E78.2 MIXED HYPERLIPIDEMIA: ICD-10-CM

## 2024-02-20 RX ORDER — ATORVASTATIN CALCIUM 10 MG/1
10 TABLET, FILM COATED ORAL DAILY
Qty: 30 TABLET | Refills: 0 | Status: SHIPPED | OUTPATIENT
Start: 2024-02-20 | End: 2024-05-08 | Stop reason: SDUPTHER

## 2024-03-19 ENCOUNTER — TELEPHONE (OUTPATIENT)
Dept: CARDIOLOGY | Facility: CLINIC | Age: 64
End: 2024-03-19
Payer: MEDICARE

## 2024-03-19 DIAGNOSIS — Z95.2 S/P AORTIC VALVE REPLACEMENT: ICD-10-CM

## 2024-03-21 ENCOUNTER — TELEPHONE (OUTPATIENT)
Dept: CARDIOLOGY | Facility: CLINIC | Age: 64
End: 2024-03-21
Payer: MEDICARE

## 2024-03-21 NOTE — TELEPHONE ENCOUNTER
----- Message from WILLIAM Santiago sent at 3/21/2024  9:19 AM EDT -----  bmp  ----- Message -----  From: Issac Briones CMA  Sent: 3/19/2024   4:10 PM EDT  To: WILLIAM Santiago

## 2024-03-22 DIAGNOSIS — E11.9 TYPE 2 DIABETES MELLITUS WITHOUT COMPLICATION, WITHOUT LONG-TERM CURRENT USE OF INSULIN (MULTI): ICD-10-CM

## 2024-03-22 RX ORDER — METFORMIN HYDROCHLORIDE 500 MG/1
500 TABLET ORAL
Qty: 30 TABLET | Refills: 0 | Status: SHIPPED | OUTPATIENT
Start: 2024-03-22 | End: 2024-05-08 | Stop reason: SDUPTHER

## 2024-03-22 NOTE — TELEPHONE ENCOUNTER
Next OV 4/24   Requested Prescriptions     Pending Prescriptions Disp Refills    metFORMIN (Glucophage) 500 mg tablet 90 tablet 0     Sig: Take 1 tablet (500 mg) by mouth once daily with breakfast.

## 2024-04-03 ENCOUNTER — APPOINTMENT (OUTPATIENT)
Dept: PRIMARY CARE | Facility: CLINIC | Age: 64
End: 2024-04-03
Payer: MEDICARE

## 2024-04-06 ENCOUNTER — LAB (OUTPATIENT)
Dept: LAB | Facility: LAB | Age: 64
End: 2024-04-06
Payer: MEDICARE

## 2024-04-06 DIAGNOSIS — Z95.2 S/P AORTIC VALVE REPLACEMENT: ICD-10-CM

## 2024-04-06 PROCEDURE — 36415 COLL VENOUS BLD VENIPUNCTURE: CPT

## 2024-04-06 PROCEDURE — 80048 BASIC METABOLIC PNL TOTAL CA: CPT

## 2024-04-07 LAB
ANION GAP SERPL CALC-SCNC: 17 MMOL/L (ref 10–20)
BUN SERPL-MCNC: 13 MG/DL (ref 6–23)
CALCIUM SERPL-MCNC: 10 MG/DL (ref 8.6–10.6)
CHLORIDE SERPL-SCNC: 105 MMOL/L (ref 98–107)
CO2 SERPL-SCNC: 25 MMOL/L (ref 21–32)
CREAT SERPL-MCNC: 1.33 MG/DL (ref 0.5–1.3)
EGFRCR SERPLBLD CKD-EPI 2021: 61 ML/MIN/1.73M*2
GLUCOSE SERPL-MCNC: 107 MG/DL (ref 74–99)
POTASSIUM SERPL-SCNC: 4.6 MMOL/L (ref 3.5–5.3)
SODIUM SERPL-SCNC: 142 MMOL/L (ref 136–145)

## 2024-05-08 ENCOUNTER — TELEPHONE (OUTPATIENT)
Dept: PRIMARY CARE | Facility: CLINIC | Age: 64
End: 2024-05-08
Payer: MEDICARE

## 2024-05-08 DIAGNOSIS — E11.9 TYPE 2 DIABETES MELLITUS WITHOUT COMPLICATION, WITHOUT LONG-TERM CURRENT USE OF INSULIN (MULTI): ICD-10-CM

## 2024-05-08 DIAGNOSIS — E78.2 MIXED HYPERLIPIDEMIA: ICD-10-CM

## 2024-05-08 RX ORDER — METFORMIN HYDROCHLORIDE 500 MG/1
500 TABLET ORAL
Qty: 14 TABLET | Refills: 0 | Status: SHIPPED | OUTPATIENT
Start: 2024-05-08 | End: 2024-05-18 | Stop reason: SDUPTHER

## 2024-05-08 RX ORDER — ATORVASTATIN CALCIUM 10 MG/1
10 TABLET, FILM COATED ORAL DAILY
Qty: 14 TABLET | Refills: 0 | Status: SHIPPED | OUTPATIENT
Start: 2024-05-08 | End: 2024-05-18 | Stop reason: SDUPTHER

## 2024-05-08 NOTE — TELEPHONE ENCOUNTER
Patient needs refills till he sees you for physical and 6 month follow he is a Dr Mariscal patient    Metformin 500mg  Atorvastatin 10mg    [facility]

## 2024-05-09 NOTE — TELEPHONE ENCOUNTER
Medication Name: jardiance  Dose: 10 mg  Frequency: daily  Pharmacy:  Quantity left: 7  Last appointment:  Next appointment: 5.16..24    Dr Mariscal patient I forgot to add to refill yesterday can you please send to hold over till he sees Dr Gunn on 5.16.24

## 2024-05-16 ENCOUNTER — OFFICE VISIT (OUTPATIENT)
Dept: PRIMARY CARE | Facility: CLINIC | Age: 64
End: 2024-05-16
Payer: MEDICARE

## 2024-05-16 VITALS
RESPIRATION RATE: 14 BRPM | WEIGHT: 232 LBS | OXYGEN SATURATION: 95 % | SYSTOLIC BLOOD PRESSURE: 118 MMHG | BODY MASS INDEX: 35.28 KG/M2 | HEART RATE: 76 BPM | DIASTOLIC BLOOD PRESSURE: 80 MMHG

## 2024-05-16 DIAGNOSIS — E66.01 CLASS 2 SEVERE OBESITY DUE TO EXCESS CALORIES WITH SERIOUS COMORBIDITY AND BODY MASS INDEX (BMI) OF 35.0 TO 35.9 IN ADULT (MULTI): ICD-10-CM

## 2024-05-16 DIAGNOSIS — Z11.59 NEED FOR HEPATITIS C SCREENING TEST: ICD-10-CM

## 2024-05-16 DIAGNOSIS — E78.2 MIXED HYPERLIPIDEMIA: ICD-10-CM

## 2024-05-16 DIAGNOSIS — Z12.5 PROSTATE CANCER SCREENING: ICD-10-CM

## 2024-05-16 DIAGNOSIS — E11.9 TYPE 2 DIABETES MELLITUS WITHOUT COMPLICATION, WITHOUT LONG-TERM CURRENT USE OF INSULIN (MULTI): ICD-10-CM

## 2024-05-16 DIAGNOSIS — I42.9 CARDIOMYOPATHY, UNSPECIFIED TYPE (MULTI): ICD-10-CM

## 2024-05-16 DIAGNOSIS — I50.22 CHRONIC SYSTOLIC (CONGESTIVE) HEART FAILURE (MULTI): ICD-10-CM

## 2024-05-16 DIAGNOSIS — I35.1 SEVERE AORTIC REGURGITATION: ICD-10-CM

## 2024-05-16 DIAGNOSIS — I10 HTN (HYPERTENSION), BENIGN: ICD-10-CM

## 2024-05-16 DIAGNOSIS — G47.33 SEVERE OBSTRUCTIVE SLEEP APNEA: ICD-10-CM

## 2024-05-16 DIAGNOSIS — Z00.00 ANNUAL PHYSICAL EXAM: Primary | ICD-10-CM

## 2024-05-16 PROBLEM — R01.1 HEART MURMUR: Status: ACTIVE | Noted: 2024-05-16

## 2024-05-16 PROBLEM — S20.219A CONTUSION OF CHEST WALL: Status: ACTIVE | Noted: 2024-05-16

## 2024-05-16 PROBLEM — N18.31 STAGE 3A CHRONIC KIDNEY DISEASE (MULTI): Status: ACTIVE | Noted: 2024-05-16

## 2024-05-16 PROBLEM — T78.3XXA ANGIOEDEMA OF LIPS: Status: ACTIVE | Noted: 2024-05-16

## 2024-05-16 PROBLEM — B00.9 HERPES SIMPLEX VIRUS (HSV) INFECTION: Status: ACTIVE | Noted: 2023-03-22

## 2024-05-16 PROBLEM — K57.92 DIVERTICULITIS: Status: ACTIVE | Noted: 2024-05-16

## 2024-05-16 PROBLEM — I36.1 NONRHEUMATIC TRICUSPID (VALVE) INSUFFICIENCY: Status: ACTIVE | Noted: 2024-05-16

## 2024-05-16 PROBLEM — Z98.890 HISTORY OF TRICUSPID VALVE REPAIR: Status: ACTIVE | Noted: 2024-05-16

## 2024-05-16 PROBLEM — R73.03 PREDIABETES: Status: ACTIVE | Noted: 2024-05-16

## 2024-05-16 PROCEDURE — 3074F SYST BP LT 130 MM HG: CPT | Performed by: FAMILY MEDICINE

## 2024-05-16 PROCEDURE — 4010F ACE/ARB THERAPY RXD/TAKEN: CPT | Performed by: FAMILY MEDICINE

## 2024-05-16 PROCEDURE — 99214 OFFICE O/P EST MOD 30 MIN: CPT | Performed by: FAMILY MEDICINE

## 2024-05-16 PROCEDURE — 3079F DIAST BP 80-89 MM HG: CPT | Performed by: FAMILY MEDICINE

## 2024-05-16 PROCEDURE — 3008F BODY MASS INDEX DOCD: CPT | Performed by: FAMILY MEDICINE

## 2024-05-16 PROCEDURE — 99396 PREV VISIT EST AGE 40-64: CPT | Performed by: FAMILY MEDICINE

## 2024-05-16 PROCEDURE — 1036F TOBACCO NON-USER: CPT | Performed by: FAMILY MEDICINE

## 2024-05-16 RX ORDER — METOPROLOL TARTRATE 50 MG/1
50 TABLET ORAL 2 TIMES DAILY
Qty: 180 TABLET | Refills: 0 | Status: SHIPPED | OUTPATIENT
Start: 2024-05-16

## 2024-05-16 RX ORDER — LOSARTAN POTASSIUM 100 MG/1
100 TABLET ORAL DAILY
Qty: 90 TABLET | Refills: 0 | Status: SHIPPED | OUTPATIENT
Start: 2024-05-16

## 2024-05-16 ASSESSMENT — LIFESTYLE VARIABLES
AUDIT-C TOTAL SCORE: 0
SKIP TO QUESTIONS 9-10: 1
HOW MANY STANDARD DRINKS CONTAINING ALCOHOL DO YOU HAVE ON A TYPICAL DAY: PATIENT DOES NOT DRINK
HOW OFTEN DO YOU HAVE SIX OR MORE DRINKS ON ONE OCCASION: NEVER
HOW OFTEN DO YOU HAVE A DRINK CONTAINING ALCOHOL: NEVER

## 2024-05-16 ASSESSMENT — PATIENT HEALTH QUESTIONNAIRE - PHQ9
2. FEELING DOWN, DEPRESSED OR HOPELESS: NOT AT ALL
2. FEELING DOWN, DEPRESSED OR HOPELESS: NOT AT ALL
SUM OF ALL RESPONSES TO PHQ9 QUESTIONS 1 AND 2: 0
1. LITTLE INTEREST OR PLEASURE IN DOING THINGS: NOT AT ALL
SUM OF ALL RESPONSES TO PHQ9 QUESTIONS 1 & 2: 0
1. LITTLE INTEREST OR PLEASURE IN DOING THINGS: NOT AT ALL

## 2024-05-16 ASSESSMENT — ENCOUNTER SYMPTOMS
DEPRESSION: 0
LOSS OF SENSATION IN FEET: 0
OCCASIONAL FEELINGS OF UNSTEADINESS: 0

## 2024-05-16 NOTE — PATIENT INSTRUCTIONS
Fasting labs.  Refilled blood pressure medications.  Will refill cholesterol and diabetes medications after reviewing lab results.  Referred to ophthalmology (eye exam) and podiatry (foot exam).  Recommend weight loss efforts (see [email or web address] for ideas).     F/U w/PCP.     Lab services: [address]  Hours: M-F 6:30a-6p, Sat 8a-12p  Phone: [phone], Option 1

## 2024-05-16 NOTE — PROGRESS NOTES
Subjective   Patient ID: Germain Pichardo is a 63 y.o. male who presents for Annual Exam (PHYSICAL).    HPI   PCP: Dr. Mariscal    Patient's health is described as good.  Regular dental visits: Yes.  Dental hygiene (brushing/flossing) regularly performed: Yes.  Corrective lenses: Yes (readers).  Vision problems: No.  Last eye exam within 1 year: Yes.  Hearing loss: No.  Requests audiology referral: No.  Immunizations up to date: No (declined pcv20).  Healthy diet: Yes.  Regular exercise: Yes.  Trying to lose weight: Yes.  Requests nutrition/weight loss referral: No.  Sexually active: Yes.  Using contraception: No.  Requests STD screening: No.  Colon cancer screening up to date: Yes (2022, repeat in 5 yrs).  Lung cancer screening up to date: N/A.  Hepatitis C screening up to date: No.    H/O DM, HLD, HTN.  Taking med(s) as directed.  Requests refills.    Of note:  Cardiology provides Jardiance.    H/O Sleep apnea,   Wants update to be noted in chart.  Uses CPAP each night.  Symptoms improved when using CPAP.    Review of Systems  H/O Chronic left wrist pain.  States he talked to PCP in the past about it.  Worsening since that discussion w/PCP.  Will make appt w/PCP for eval.     Objective   /80   Pulse 76   Resp 14   Wt 105 kg (232 lb)   SpO2 95%   BMI 35.28 kg/m²     Physical Exam  Constitutional:       General: He is not in acute distress.     Appearance: He is obese.   HENT:      Head: Normocephalic.      Right Ear: Tympanic membrane normal.      Left Ear: Tympanic membrane normal.      Mouth/Throat:      Pharynx: Oropharynx is clear. No oropharyngeal exudate or posterior oropharyngeal erythema.   Eyes:      Extraocular Movements: Extraocular movements intact.      Conjunctiva/sclera: Conjunctivae normal.      Pupils: Pupils are equal, round, and reactive to light.   Neck:      Vascular: No carotid bruit.   Cardiovascular:      Rate and Rhythm: Normal rate and regular rhythm.      Heart sounds: Normal heart  sounds. No murmur heard.     No friction rub. No gallop.   Pulmonary:      Effort: Pulmonary effort is normal.      Breath sounds: Normal breath sounds. No wheezing, rhonchi or rales.   Abdominal:      General: Bowel sounds are normal. There is no distension.      Palpations: Abdomen is soft. There is no mass.      Tenderness: There is no abdominal tenderness. There is no guarding or rebound.   Genitourinary:     Comments: Declined prostate exam  Lymphadenopathy:      Cervical: No cervical adenopathy.   Skin:     Coloration: Skin is not jaundiced or pale.   Neurological:      General: No focal deficit present.      Mental Status: He is oriented to person, place, and time.   Psychiatric:         Mood and Affect: Mood normal.         Behavior: Behavior normal.     Assessment/Plan   Diagnoses and all orders for this visit:  Annual physical exam  Type 2 diabetes mellitus without complication, without long-term current use of insulin (Multi)  -     Basic Metabolic Panel; Future  -     Vitamin B12; Future  -     Albumin , Urine Random; Future  -     Hemoglobin A1C; Future  -     Referral to Ophthalmology; Future  -     Referral to Podiatry; Future  HTN (hypertension), benign  -     CBC; Future  -     Basic Metabolic Panel; Future  -     losartan (Cozaar) 100 mg tablet; Take 1 tablet (100 mg) by mouth once daily.  -     metoprolol tartrate (Lopressor) 50 mg tablet; Take 1 tablet by mouth 2 times a day.  Mixed hyperlipidemia  -     Lipid Panel; Future  -     Aspartate Aminotransferase; Future  -     Alanine Aminotransferase; Future  Prostate cancer screening  -     Prostate Specific Antigen, Screen; Future  Need for hepatitis C screening test  -     Hepatitis C Antibody; Future  Severe aortic regurgitation        -     Followed by cardiology  Chronic systolic (congestive) heart failure (Multi)        -     Followed by cardiology  Cardiomyopathy, unspecified type (Multi)        -     Followed by cardiology  Severe  obstructive sleep apnea        -     Patient uses CPAP each night.  Symptoms improved when using CPAP  Class 2 severe obesity due to excess calories with serious comorbidity and body mass index (BMI) of 35.0 to 35.9 in adult (Multi)    Fasting labs.  Refilled blood pressure medications.  Will refill cholesterol and diabetes medications after reviewing lab results.  Referred to ophthalmology (eye exam) and podiatry (foot exam).  Recommend weight loss efforts (see [email or web address] for ideas).     F/U w/PCP.

## 2024-05-17 ENCOUNTER — LAB (OUTPATIENT)
Dept: LAB | Facility: LAB | Age: 64
End: 2024-05-17
Payer: COMMERCIAL

## 2024-05-17 DIAGNOSIS — E78.2 MIXED HYPERLIPIDEMIA: ICD-10-CM

## 2024-05-17 DIAGNOSIS — E11.9 TYPE 2 DIABETES MELLITUS WITHOUT COMPLICATION, WITHOUT LONG-TERM CURRENT USE OF INSULIN (MULTI): ICD-10-CM

## 2024-05-17 DIAGNOSIS — I10 HTN (HYPERTENSION), BENIGN: ICD-10-CM

## 2024-05-17 DIAGNOSIS — Z12.5 PROSTATE CANCER SCREENING: ICD-10-CM

## 2024-05-17 DIAGNOSIS — Z11.59 NEED FOR HEPATITIS C SCREENING TEST: ICD-10-CM

## 2024-05-17 LAB
ALT SERPL W P-5'-P-CCNC: 42 U/L (ref 10–52)
ANION GAP SERPL CALC-SCNC: 13 MMOL/L (ref 10–20)
AST SERPL W P-5'-P-CCNC: 37 U/L (ref 9–39)
BUN SERPL-MCNC: 14 MG/DL (ref 6–23)
CALCIUM SERPL-MCNC: 9.5 MG/DL (ref 8.6–10.6)
CHLORIDE SERPL-SCNC: 105 MMOL/L (ref 98–107)
CHOLEST SERPL-MCNC: 117 MG/DL (ref 0–199)
CHOLESTEROL/HDL RATIO: 3.3
CO2 SERPL-SCNC: 25 MMOL/L (ref 21–32)
CREAT SERPL-MCNC: 1.21 MG/DL (ref 0.5–1.3)
CREAT UR-MCNC: 84.2 MG/DL (ref 20–370)
EGFRCR SERPLBLD CKD-EPI 2021: 68 ML/MIN/1.73M*2
ERYTHROCYTE [DISTWIDTH] IN BLOOD BY AUTOMATED COUNT: 17.2 % (ref 11.5–14.5)
EST. AVERAGE GLUCOSE BLD GHB EST-MCNC: 151 MG/DL
GLUCOSE SERPL-MCNC: 119 MG/DL (ref 74–99)
HBA1C MFR BLD: 6.9 %
HCT VFR BLD AUTO: 46.8 % (ref 41–52)
HCV AB SER QL: NONREACTIVE
HDLC SERPL-MCNC: 35.4 MG/DL
HGB BLD-MCNC: 15.3 G/DL (ref 13.5–17.5)
LDLC SERPL CALC-MCNC: 52 MG/DL
MCH RBC QN AUTO: 24 PG (ref 26–34)
MCHC RBC AUTO-ENTMCNC: 32.7 G/DL (ref 32–36)
MCV RBC AUTO: 74 FL (ref 80–100)
MICROALBUMIN UR-MCNC: 9.2 MG/L
MICROALBUMIN/CREAT UR: 10.9 UG/MG CREAT
NON HDL CHOLESTEROL: 82 MG/DL (ref 0–149)
NRBC BLD-RTO: 0 /100 WBCS (ref 0–0)
PLATELET # BLD AUTO: 220 X10*3/UL (ref 150–450)
POTASSIUM SERPL-SCNC: 4.6 MMOL/L (ref 3.5–5.3)
PSA SERPL-MCNC: 2.59 NG/ML
RBC # BLD AUTO: 6.37 X10*6/UL (ref 4.5–5.9)
SODIUM SERPL-SCNC: 138 MMOL/L (ref 136–145)
TRIGL SERPL-MCNC: 147 MG/DL (ref 0–149)
VIT B12 SERPL-MCNC: 300 PG/ML (ref 211–911)
VLDL: 29 MG/DL (ref 0–40)
WBC # BLD AUTO: 5.9 X10*3/UL (ref 4.4–11.3)

## 2024-05-17 PROCEDURE — 82043 UR ALBUMIN QUANTITATIVE: CPT

## 2024-05-17 PROCEDURE — 85027 COMPLETE CBC AUTOMATED: CPT

## 2024-05-17 PROCEDURE — 86803 HEPATITIS C AB TEST: CPT

## 2024-05-17 PROCEDURE — 82570 ASSAY OF URINE CREATININE: CPT

## 2024-05-17 PROCEDURE — 82607 VITAMIN B-12: CPT

## 2024-05-17 PROCEDURE — 83036 HEMOGLOBIN GLYCOSYLATED A1C: CPT

## 2024-05-17 PROCEDURE — 80061 LIPID PANEL: CPT

## 2024-05-17 PROCEDURE — 80048 BASIC METABOLIC PNL TOTAL CA: CPT

## 2024-05-17 PROCEDURE — 36415 COLL VENOUS BLD VENIPUNCTURE: CPT

## 2024-05-17 PROCEDURE — 84450 TRANSFERASE (AST) (SGOT): CPT

## 2024-05-17 PROCEDURE — 84153 ASSAY OF PSA TOTAL: CPT

## 2024-05-17 PROCEDURE — 84460 ALANINE AMINO (ALT) (SGPT): CPT

## 2024-05-18 DIAGNOSIS — E11.9 TYPE 2 DIABETES MELLITUS WITHOUT COMPLICATION, WITHOUT LONG-TERM CURRENT USE OF INSULIN (MULTI): ICD-10-CM

## 2024-05-18 DIAGNOSIS — E78.2 MIXED HYPERLIPIDEMIA: ICD-10-CM

## 2024-05-18 RX ORDER — ATORVASTATIN CALCIUM 10 MG/1
10 TABLET, FILM COATED ORAL DAILY
Qty: 90 TABLET | Refills: 0 | Status: SHIPPED | OUTPATIENT
Start: 2024-05-18

## 2024-05-18 RX ORDER — METFORMIN HYDROCHLORIDE 500 MG/1
500 TABLET ORAL
Qty: 90 TABLET | Refills: 0 | Status: SHIPPED | OUTPATIENT
Start: 2024-05-18

## 2024-06-07 ENCOUNTER — APPOINTMENT (OUTPATIENT)
Dept: PRIMARY CARE | Facility: CLINIC | Age: 64
End: 2024-06-07
Payer: MEDICARE

## 2024-08-02 ENCOUNTER — APPOINTMENT (OUTPATIENT)
Dept: PRIMARY CARE | Facility: CLINIC | Age: 64
End: 2024-08-02
Payer: MEDICARE

## 2024-08-02 VITALS
SYSTOLIC BLOOD PRESSURE: 125 MMHG | DIASTOLIC BLOOD PRESSURE: 79 MMHG | WEIGHT: 228 LBS | BODY MASS INDEX: 34.67 KG/M2 | HEART RATE: 73 BPM | TEMPERATURE: 98 F | OXYGEN SATURATION: 96 %

## 2024-08-02 DIAGNOSIS — I10 HTN (HYPERTENSION), BENIGN: ICD-10-CM

## 2024-08-02 DIAGNOSIS — M25.532 WRIST PAIN, CHRONIC, LEFT: Primary | ICD-10-CM

## 2024-08-02 DIAGNOSIS — L72.3 SEBACEOUS CYST: ICD-10-CM

## 2024-08-02 DIAGNOSIS — E11.9 TYPE 2 DIABETES MELLITUS WITHOUT COMPLICATION, WITHOUT LONG-TERM CURRENT USE OF INSULIN (MULTI): ICD-10-CM

## 2024-08-02 DIAGNOSIS — E78.2 MIXED HYPERLIPIDEMIA: ICD-10-CM

## 2024-08-02 DIAGNOSIS — G89.29 WRIST PAIN, CHRONIC, LEFT: Primary | ICD-10-CM

## 2024-08-02 PROBLEM — E66.01 MORBID OBESITY (MULTI): Status: RESOLVED | Noted: 2023-02-20 | Resolved: 2024-08-02

## 2024-08-02 PROBLEM — R73.03 PREDIABETES: Status: RESOLVED | Noted: 2024-05-16 | Resolved: 2024-08-02

## 2024-08-02 PROCEDURE — 3044F HG A1C LEVEL LT 7.0%: CPT | Performed by: FAMILY MEDICINE

## 2024-08-02 PROCEDURE — 4010F ACE/ARB THERAPY RXD/TAKEN: CPT | Performed by: FAMILY MEDICINE

## 2024-08-02 PROCEDURE — 3061F NEG MICROALBUMINURIA REV: CPT | Performed by: FAMILY MEDICINE

## 2024-08-02 PROCEDURE — 3048F LDL-C <100 MG/DL: CPT | Performed by: FAMILY MEDICINE

## 2024-08-02 PROCEDURE — 1036F TOBACCO NON-USER: CPT | Performed by: FAMILY MEDICINE

## 2024-08-02 PROCEDURE — 3074F SYST BP LT 130 MM HG: CPT | Performed by: FAMILY MEDICINE

## 2024-08-02 PROCEDURE — 99214 OFFICE O/P EST MOD 30 MIN: CPT | Performed by: FAMILY MEDICINE

## 2024-08-02 PROCEDURE — 3078F DIAST BP <80 MM HG: CPT | Performed by: FAMILY MEDICINE

## 2024-08-02 RX ORDER — METFORMIN HYDROCHLORIDE 500 MG/1
500 TABLET ORAL
Qty: 90 TABLET | Refills: 0 | Status: SHIPPED | OUTPATIENT
Start: 2024-08-02

## 2024-08-02 ASSESSMENT — PAIN SCALES - GENERAL: PAINLEVEL: 0-NO PAIN

## 2024-08-02 NOTE — ASSESSMENT & PLAN NOTE
Checking A1c level and adjust meds accordingly.  Eye exam up-to-date.  Urine albumin up-to-date.  Continue weight loss efforts.  Continue diet changes.  Recheck in 3 months

## 2024-08-02 NOTE — PROGRESS NOTES
Subjective   Patient ID: Germain Pichardo is a 63 y.o. male who presents for Wrist Injury (LEFT ) and Mass (FOLLOW UP /).    Here for recheck wrist    Admits to injury many years ago   and kicked back and jammed the wrist  Did PT and helped  Now getting bad again  Pain can shoot up into thumb area and loses strength    Has a cyst in his left lower abd  Was to see Derm and resolved, came back.     Wrist Injury        Review of Systems    Objective   /79 (BP Location: Left arm, Patient Position: Sitting, BP Cuff Size: Adult long)   Pulse 73   Temp 36.7 °C (98 °F) (Temporal)   Wt 103 kg (228 lb)   SpO2 96%   BMI 34.67 kg/m²     Physical Exam  Vitals and nursing note reviewed.   Constitutional:       Appearance: Normal appearance.   Cardiovascular:      Rate and Rhythm: Normal rate and regular rhythm.   Pulmonary:      Effort: Pulmonary effort is normal.      Breath sounds: Normal breath sounds.   Musculoskeletal:      Cervical back: Normal range of motion.   Neurological:      Mental Status: He is alert.   Psychiatric:         Mood and Affect: Mood normal.         Behavior: Behavior normal.         Thought Content: Thought content normal.         Judgment: Judgment normal.       Assessment/Plan   Problem List Items Addressed This Visit             ICD-10-CM    Type 2 diabetes mellitus without complication, without long-term current use of insulin (Multi) E11.9     Checking A1c level and adjust meds accordingly.  Eye exam up-to-date.  Urine albumin up-to-date.  Continue weight loss efforts.  Continue diet changes.  Recheck in 3 months         Relevant Medications    metFORMIN (Glucophage) 500 mg tablet    Other Relevant Orders    Hemoglobin A1C    HTN (hypertension), benign I10     Stable per patient.  Reviewed BP numbers.  Continue weight loss efforts.  Recheck in 3 months         Relevant Orders    CBC and Auto Differential    Hyperlipidemia E78.5     Stable  Reviewed diet.  Checking fasting labs  and adjust meds accordingly         Relevant Orders    Comprehensive Metabolic Panel    Lipid Panel    Sebaceous cyst L72.3     Referring back to Derm         Relevant Orders    Referral to Dermatology    Wrist pain, chronic, left - Primary M25.532, G89.29     Reviewed previous xray  Referral to ortho         Relevant Orders    Referral to Orthopaedic Surgery

## 2024-08-07 ENCOUNTER — OFFICE VISIT (OUTPATIENT)
Dept: DERMATOLOGY | Facility: CLINIC | Age: 64
End: 2024-08-07
Payer: MEDICARE

## 2024-08-07 DIAGNOSIS — L85.3 XEROSIS CUTIS: ICD-10-CM

## 2024-08-07 DIAGNOSIS — L72.0 EPIDERMOID CYST: Primary | ICD-10-CM

## 2024-08-07 DIAGNOSIS — L73.1 PSEUDOFOLLICULITIS BARBAE: ICD-10-CM

## 2024-08-07 PROCEDURE — 3044F HG A1C LEVEL LT 7.0%: CPT | Performed by: DERMATOLOGY

## 2024-08-07 PROCEDURE — 4010F ACE/ARB THERAPY RXD/TAKEN: CPT | Performed by: DERMATOLOGY

## 2024-08-07 PROCEDURE — 3048F LDL-C <100 MG/DL: CPT | Performed by: DERMATOLOGY

## 2024-08-07 PROCEDURE — 99214 OFFICE O/P EST MOD 30 MIN: CPT | Performed by: DERMATOLOGY

## 2024-08-07 PROCEDURE — 3061F NEG MICROALBUMINURIA REV: CPT | Performed by: DERMATOLOGY

## 2024-08-07 RX ORDER — CLINDAMYCIN PHOSPHATE 10 UG/ML
1 LOTION TOPICAL 2 TIMES DAILY
Qty: 30 ML | Refills: 11 | Status: SHIPPED | OUTPATIENT
Start: 2024-08-07 | End: 2025-08-07

## 2024-08-07 ASSESSMENT — DERMATOLOGY PATIENT ASSESSMENT
DO YOU USE A TANNING BED: NO
DO YOU HAVE ANY NEW OR CHANGING LESIONS: NO

## 2024-08-07 ASSESSMENT — DERMATOLOGY QUALITY OF LIFE (QOL) ASSESSMENT: ARE THERE EXCLUSIONS OR EXCEPTIONS FOR THE QUALITY OF LIFE ASSESSMENT: NO

## 2024-08-07 NOTE — PROGRESS NOTES
Subjective     Germain Pichardo is a 63 y.o. male who presents for the following: Cyst (Left waistline).  The patient states the bump in the fold of his left lower abdomen has been present for many years.  He reports in the past it has gotten larger and red and then would drain a smelly yellowish fluid and it sometimes bleed.  Now he reports it intermittently gets larger and smaller.  He also notes intermittent ingrown hairs on the front of his neck and pimples in his pubic area.  He denies any other new, changing, or concerning skin lesions; no bleeding, itching, or burning lesions.      Review of Systems:  No other skin or systemic complaints other than what is documented elsewhere in the note.    The following portions of the chart were reviewed this encounter and updated as appropriate:       Skin Cancer History  No skin cancer on file.    Specialty Problems          Dermatology Problems    Wart of hand    Contusion of chest wall    Sebaceous cyst       Past Dermatologic / Past Relevant Medical History:    No history of skin cancer    Family History:    No family history of melanoma or skin cancer    Allergies:  Acyclovir, Chocolate, Cocoa, Lisinopril, Raw vegetable, and Tomato    Current Medications / CAM's:    Current Outpatient Medications:     acetaminophen (Tylenol) 325 mg tablet, Take 1 tablet (325 mg) by mouth every 6 hours if needed for moderate pain (4 - 6)., Disp: , Rfl:     aspirin 81 mg EC tablet, Take 1 tablet (81 mg) by mouth once daily., Disp: , Rfl:     atorvastatin (Lipitor) 10 mg tablet, Take 1 tablet (10 mg) by mouth once daily., Disp: 90 tablet, Rfl: 0    cholecalciferol (Vitamin D-3) 50 MCG (2000 UT) tablet, Take 1 tablet (2,000 Units) by mouth once daily., Disp: , Rfl:     clindamycin (Cleocin T) 1 % lotion, Apply 1 Application topically 2 times a day. To neck, pubic area, and cyst on abdominal fold as needed for bumps, Disp: 30 mL, Rfl: 11    clobetasol (Temovate) 0.05 % ointment, Apply 1  Application topically 2 times a day. Apply and gently massage topically to the affected area twice a day., Disp: , Rfl:     empagliflozin (Jardiance) 10 mg, Take 1 tablet (10 mg) by mouth once daily., Disp: 90 tablet, Rfl: 3    losartan (Cozaar) 100 mg tablet, Take 1 tablet (100 mg) by mouth once daily., Disp: 90 tablet, Rfl: 0    metFORMIN (Glucophage) 500 mg tablet, Take 1 tablet (500 mg) by mouth once daily with breakfast., Disp: 90 tablet, Rfl: 0    metoprolol tartrate (Lopressor) 50 mg tablet, Take 1 tablet by mouth 2 times a day., Disp: 180 tablet, Rfl: 0    multivit-iron-minerals-folic acid (Centrum Silver) 0.4 mg-300 mcg- 250 mcg tab, Take 1 tablet by mouth once daily., Disp: , Rfl:     neomycin-bacitracnZn-polymyxnB 3.5-500-10,000 mg-unit-unit ointment, once daily., Disp: , Rfl:      Objective   Well appearing patient in no apparent distress; mood and affect are within normal limits.    A waist-up examination was performed including scalp, face, eyes, ears, nose, lips, neck, chest, axillae, abdomen, back, and bilateral upper extremities. All findings within normal limits unless otherwise noted below.        Assessment/Plan   1. Epidermoid cyst  Left Suprapubic Area  On the patient's left infra-abdominal fold, there is a 1 cm round, flesh-colored, mobile, non-tender, subcutaneous, cystic-appearing nodule with a central punctum.    Epidermoid Cyst - left infra-bdominal fold.  The benign nature of this cystic lesion was discussed with the patient today and reassurance provided.  No treatment is medically indicated for this lesion at this time.  However, given the history the patient provides of frequent waxing and waning of size, and previous symptoms, the patient wishes to have the lesion removed if possible.  Thus, we discussed the possibility of undergoing surgical excision of the cyst for definitive removal.  After discussing the risks and benefits of the procedure, the patient expressed understanding and  wishes to have the cyst excised.  Thus, the patient was provided a referral today to our Dermatologic surgery unit for further evaluation and consideration of cyst excision.  The patient expressed understanding, is in agreement with this plan, and will anticipate a phone call from our surgery unit within the next 1-2 weeks to schedule the surgery.    Referral to Dermatology - Mohs Surgery - Left Suprapubic Area    clindamycin (Cleocin T) 1 % lotion - Left Suprapubic Area  Apply 1 Application topically 2 times a day. To neck, pubic area, and cyst on abdominal fold as needed for bumps    2. Pseudofolliculitis barbae  Neck - Anterior  Scattered on the anterior neck, there are a few follicular-based erythematous, inflammatory papules with ingrown hairs    Psoedofolliculitis Barbae - anterior neck.  The nature of this condition and treatment options were discussed extensively with the patient today.  At this time, we recommend topical therapy with Clindamycin 1% lotion twice daily or up to 3-4 times per day as needed for active lesions.  The risks, benefits, and side effects of this medication were discussed.  He patient expressed understanding and is in agreement with this plan.    clindamycin (Cleocin T) 1 % lotion - Neck - Anterior  Apply 1 Application topically 2 times a day. To neck, pubic area, and cyst on abdominal fold as needed for bumps    3. Xerosis cutis  Diffuse generalized dry, scaly skin.    Xerosis.  We emphasized the importance of dry, sensitive skin care, including the use of a mild soap, such as Dove, and frequent and aggressive moisturization, at least twice daily and immediately following showers or baths, with recommended over-the-counter moisturizing creams, such as Eucerin, Cetaphil, Cerave, or Aveeno, or Vaseline or Aquaphor ointments.  The patient was also provided an informational hand-out today containing further details on dry skin care.        Seen and discussed with attending physician   Susan Yanez MD, PhD  Resident, Dermatology      I saw and evaluated the patient. I personally obtained the key and critical portions of the history and physical exam or was physically present for key and critical portions performed by the resident/fellow. I reviewed the resident/fellow's documentation and discussed the patient with the resident/fellow. I agree with the resident/fellow's medical decision making as documented in the note.    Sekou Davis MD

## 2024-08-20 ENCOUNTER — APPOINTMENT (OUTPATIENT)
Dept: ORTHOPEDIC SURGERY | Facility: CLINIC | Age: 64
End: 2024-08-20
Payer: MEDICARE

## 2024-08-21 ENCOUNTER — LAB (OUTPATIENT)
Dept: LAB | Facility: LAB | Age: 64
End: 2024-08-21
Payer: COMMERCIAL

## 2024-08-21 DIAGNOSIS — E78.2 MIXED HYPERLIPIDEMIA: ICD-10-CM

## 2024-08-21 DIAGNOSIS — I10 HTN (HYPERTENSION), BENIGN: ICD-10-CM

## 2024-08-21 DIAGNOSIS — E11.9 TYPE 2 DIABETES MELLITUS WITHOUT COMPLICATION, WITHOUT LONG-TERM CURRENT USE OF INSULIN (MULTI): ICD-10-CM

## 2024-08-21 LAB
ALBUMIN SERPL BCP-MCNC: 4.6 G/DL (ref 3.4–5)
ALP SERPL-CCNC: 59 U/L (ref 33–136)
ALT SERPL W P-5'-P-CCNC: 35 U/L (ref 10–52)
ANION GAP SERPL CALC-SCNC: 12 MMOL/L (ref 10–20)
AST SERPL W P-5'-P-CCNC: 28 U/L (ref 9–39)
BASOPHILS # BLD AUTO: 0.03 X10*3/UL (ref 0–0.1)
BASOPHILS NFR BLD AUTO: 0.5 %
BILIRUB SERPL-MCNC: 0.7 MG/DL (ref 0–1.2)
BUN SERPL-MCNC: 12 MG/DL (ref 6–23)
CALCIUM SERPL-MCNC: 10 MG/DL (ref 8.6–10.6)
CHLORIDE SERPL-SCNC: 107 MMOL/L (ref 98–107)
CHOLEST SERPL-MCNC: 119 MG/DL (ref 0–199)
CHOLESTEROL/HDL RATIO: 3.1
CO2 SERPL-SCNC: 26 MMOL/L (ref 21–32)
CREAT SERPL-MCNC: 1.25 MG/DL (ref 0.5–1.3)
EGFRCR SERPLBLD CKD-EPI 2021: 66 ML/MIN/1.73M*2
EOSINOPHIL # BLD AUTO: 0.17 X10*3/UL (ref 0–0.7)
EOSINOPHIL NFR BLD AUTO: 3.1 %
ERYTHROCYTE [DISTWIDTH] IN BLOOD BY AUTOMATED COUNT: 17.6 % (ref 11.5–14.5)
EST. AVERAGE GLUCOSE BLD GHB EST-MCNC: 143 MG/DL
GLUCOSE SERPL-MCNC: 112 MG/DL (ref 74–99)
HBA1C MFR BLD: 6.6 %
HCT VFR BLD AUTO: 47.7 % (ref 41–52)
HDLC SERPL-MCNC: 38.7 MG/DL
HGB BLD-MCNC: 15.8 G/DL (ref 13.5–17.5)
IMM GRANULOCYTES # BLD AUTO: 0.01 X10*3/UL (ref 0–0.7)
IMM GRANULOCYTES NFR BLD AUTO: 0.2 % (ref 0–0.9)
LDLC SERPL CALC-MCNC: 47 MG/DL
LYMPHOCYTES # BLD AUTO: 2.43 X10*3/UL (ref 1.2–4.8)
LYMPHOCYTES NFR BLD AUTO: 44.2 %
MCH RBC QN AUTO: 23.8 PG (ref 26–34)
MCHC RBC AUTO-ENTMCNC: 33.1 G/DL (ref 32–36)
MCV RBC AUTO: 72 FL (ref 80–100)
MONOCYTES # BLD AUTO: 0.47 X10*3/UL (ref 0.1–1)
MONOCYTES NFR BLD AUTO: 8.5 %
NEUTROPHILS # BLD AUTO: 2.39 X10*3/UL (ref 1.2–7.7)
NEUTROPHILS NFR BLD AUTO: 43.5 %
NON HDL CHOLESTEROL: 80 MG/DL (ref 0–149)
NRBC BLD-RTO: 0 /100 WBCS (ref 0–0)
PLATELET # BLD AUTO: 220 X10*3/UL (ref 150–450)
POTASSIUM SERPL-SCNC: 4.9 MMOL/L (ref 3.5–5.3)
PROT SERPL-MCNC: 7 G/DL (ref 6.4–8.2)
RBC # BLD AUTO: 6.63 X10*6/UL (ref 4.5–5.9)
SODIUM SERPL-SCNC: 140 MMOL/L (ref 136–145)
TRIGL SERPL-MCNC: 168 MG/DL (ref 0–149)
VLDL: 34 MG/DL (ref 0–40)
WBC # BLD AUTO: 5.5 X10*3/UL (ref 4.4–11.3)

## 2024-08-21 PROCEDURE — 80053 COMPREHEN METABOLIC PANEL: CPT

## 2024-08-21 PROCEDURE — 36415 COLL VENOUS BLD VENIPUNCTURE: CPT

## 2024-08-21 PROCEDURE — 80061 LIPID PANEL: CPT

## 2024-08-21 PROCEDURE — 85025 COMPLETE CBC W/AUTO DIFF WBC: CPT

## 2024-08-21 PROCEDURE — 83036 HEMOGLOBIN GLYCOSYLATED A1C: CPT

## 2024-08-27 ENCOUNTER — HOSPITAL ENCOUNTER (OUTPATIENT)
Dept: RADIOLOGY | Facility: CLINIC | Age: 64
Discharge: HOME | End: 2024-08-27
Payer: COMMERCIAL

## 2024-08-27 ENCOUNTER — APPOINTMENT (OUTPATIENT)
Dept: ORTHOPEDIC SURGERY | Facility: CLINIC | Age: 64
End: 2024-08-27
Payer: MEDICARE

## 2024-08-27 DIAGNOSIS — M19.132 SCAPHOLUNATE ADVANCED COLLAPSE OF LEFT WRIST: Primary | ICD-10-CM

## 2024-08-27 DIAGNOSIS — M25.532 LEFT WRIST PAIN: ICD-10-CM

## 2024-08-27 DIAGNOSIS — G89.29 WRIST PAIN, CHRONIC, LEFT: ICD-10-CM

## 2024-08-27 DIAGNOSIS — M25.532 WRIST PAIN, CHRONIC, LEFT: ICD-10-CM

## 2024-08-27 PROCEDURE — 73110 X-RAY EXAM OF WRIST: CPT | Mod: LT

## 2024-08-27 PROCEDURE — 3061F NEG MICROALBUMINURIA REV: CPT | Performed by: ORTHOPAEDIC SURGERY

## 2024-08-27 PROCEDURE — 4010F ACE/ARB THERAPY RXD/TAKEN: CPT | Performed by: ORTHOPAEDIC SURGERY

## 2024-08-27 PROCEDURE — 73110 X-RAY EXAM OF WRIST: CPT | Mod: LEFT SIDE | Performed by: RADIOLOGY

## 2024-08-27 PROCEDURE — 3048F LDL-C <100 MG/DL: CPT | Performed by: ORTHOPAEDIC SURGERY

## 2024-08-27 PROCEDURE — 1036F TOBACCO NON-USER: CPT | Performed by: ORTHOPAEDIC SURGERY

## 2024-08-27 PROCEDURE — 99203 OFFICE O/P NEW LOW 30 MIN: CPT | Performed by: ORTHOPAEDIC SURGERY

## 2024-08-27 PROCEDURE — 3044F HG A1C LEVEL LT 7.0%: CPT | Performed by: ORTHOPAEDIC SURGERY

## 2024-08-27 ASSESSMENT — PAIN - FUNCTIONAL ASSESSMENT: PAIN_FUNCTIONAL_ASSESSMENT: NO/DENIES PAIN

## 2024-08-28 NOTE — PROGRESS NOTES
History of Present Illness:  Chief Complaint   Patient presents with    Left Wrist - Pain       Patient presents today for evaluation of left wrist pain that has been progressive over many years.  He had an injury to his wrist at least 10 years ago when he was using a snowblower and it hit something causing the  to kick back and jarring his wrist.  He had a sharp pain as well as swelling at that time, but he reports no one determine the exact injury and he was referred to therapy.  He did have some improvements initially, but now has intermittent pains into his left wrist.  This is worse with loading and increased activities.  No pain at rest.  He does regularly utilize a wrist brace to help support his wrist and avoid the pain he sometimes experiences.  No numbness or tingling.    Past Medical History:   Diagnosis Date    Diverticulitis of intestine, part unspecified, without perforation or abscess without bleeding     Diverticulitis    Epigastric pain 01/31/2017    Dyspepsia    Nonrheumatic aortic (valve) insufficiency     Severe aortic regurgitation    Other forms of dyspnea 02/11/2021    Dyspnea on exertion    Other hemorrhoids     Internal hemorrhoid    Pain in left wrist 01/30/2018    Chronic pain of left wrist    Personal history of colonic polyps 01/30/2018    History of colonic polyps    Personal history of other specified conditions 02/11/2021    History of chest pain    Plantar fascial fibromatosis 01/30/2018    Plantar fasciitis    Presence of other vascular implants and grafts 07/13/2021    S/P ascending aortic replacement    Presence of prosthetic heart valve 05/13/2022    S/P aortic valve replacement    Rheumatic tricuspid insufficiency     Tricuspid regurgitation       Medication Documentation Review Audit       Reviewed by Verenice Scales CMA (Medical Assistant) on 08/27/24 at 0835      Medication Order Taking? Sig Documenting Provider Last Dose Status   acetaminophen (Tylenol) 325 mg tablet  [id number] No Take 1 tablet (325 mg) by mouth every 6 hours if needed for moderate pain (4 - 6). Historical Provider, MD Taking Active   aspirin 81 mg EC tablet [id number] No Take 1 tablet (81 mg) by mouth once daily. Historical Provider, MD Taking Active   atorvastatin (Lipitor) 10 mg tablet [id number]  Take 1 tablet (10 mg) by mouth once daily. Lee Gunn MD  Active   cholecalciferol (Vitamin D-3) 50 MCG (2000 UT) tablet [id number] No Take 1 tablet (2,000 Units) by mouth once daily. Historical Provider, MD Taking Active   clindamycin (Cleocin T) 1 % lotion [id number]  Apply 1 Application topically 2 times a day. To neck, pubic area, and cyst on abdominal fold as needed for bumps Sekou Davis MD  Active   clobetasol (Temovate) 0.05 % ointment [id number] No Apply 1 Application topically 2 times a day. Apply and gently massage topically to the affected area twice a day. Historical Provider, MD Taking Active   empagliflozin (Jardiance) 10 mg [id number] No Take 1 tablet (10 mg) by mouth once daily. Marcia Lyn, APRN-CNP Taking Active   losartan (Cozaar) 100 mg tablet [id number]  Take 1 tablet (100 mg) by mouth once daily. Lee Gunn MD  Active   metFORMIN (Glucophage) 500 mg tablet [id number]  Take 1 tablet (500 mg) by mouth once daily with breakfast. Lindsey Rock Pla, DO  Active   metoprolol tartrate (Lopressor) 50 mg tablet [id number]  Take 1 tablet by mouth 2 times a day. Lee Gunn MD  Active   multivit-iron-minerals-folic acid (Centrum Silver) 0.4 mg-300 mcg- 250 mcg tab [id number] No Take 1 tablet by mouth once daily. Historical Provider, MD Taking Active   neomycin-bacitracnZn-polymyxnB 3.5-500-10,000 mg-unit-unit ointment [id number] No once daily. Historical Provider, MD Taking Active                    Allergies   Allergen Reactions    Acyclovir Unknown     Per Dr. Reinoso, avoid.    Chocolate Other    Cocoa Unknown    Lisinopril Unknown    Raw Vegetable Unknown     Any and all forms of tomatoes!     Tomato Other       Social History     Socioeconomic History    Marital status:      Spouse name: Not on file    Number of children: Not on file    Years of education: Not on file    Highest education level: Not on file   Occupational History    Not on file   Tobacco Use    Smoking status: Never     Passive exposure: Never    Smokeless tobacco: Never   Substance and Sexual Activity    Alcohol use: Not Currently    Drug use: Not Currently    Sexual activity: Not on file   Other Topics Concern    Not on file   Social History Narrative    Not on file     Social Determinants of Health     Financial Resource Strain: Not on file   Food Insecurity: Not on file   Transportation Needs: Not on file   Physical Activity: Not on file   Stress: Not on file   Social Connections: Not on file   Intimate Partner Violence: Not on file   Housing Stability: Not on file       Past Surgical History:   Procedure Laterality Date    CT ANGIO CORONARY ART WITH HEARTFLOW IF SCORE >30%  3/5/2021    CT HEART CORONARY ANGIOGRAM 3/5/2021 [facility] CLINICAL LEGACY    OTHER SURGICAL HISTORY  08/13/2019    Complete colonoscopy    OTHER SURGICAL HISTORY  08/10/2021    Aortic valve replacement    OTHER SURGICAL HISTORY  08/10/2021    Heart surgery        Review of Systems   GENERAL: Negative for malaise, significant weight loss, fever  MUSCULOSKELETAL: see HPI  NEURO:  Negative     Physical Examination  Constitutional: Appears well-developed and well-nourished.  Head: Normocephalic and atraumatic.  Eyes: EOMI grossly  Cardiovascular: Intact distal pulses.   Respiratory: Effort normal. No respiratory distress.  Neurologic: Alert and oriented to person, place, and time.  Skin: Skin is warm and dry.  Hematologic / Lymphatic: No lymphedema, lymphangitis.  Psychiatric: normal mood and affect. Behavior is normal.   Musculoskeletal:  Left wrist: 60/60 degrees flexion/extension.  80/80 degrees pronation/supination.  Synovitis about the dorsal/radial aspect of  the wrist with mild associated tenderness.  Sensation grossly intact distally.  2+ radial pulse.  Positive NAHOMY.  Negative DRUJ/TFCC    Radiographs: Left wrist radiographs ordered and available for my review/interpretation demonstrate SLAC wrist with significant distal radius/scaphoid arthritis and widening of SL interval.  Midcarpal joint appears relatively maintained at this time     Assessment:  Patient with SLAC wrist and intermittent associated symptoms    Plan:  Nature of the diagnosis was discussed with the patient.  We discussed natural disease history as well as risks and benefits of various treatment options including continued conservative treatment modalities with bracing, anti-inflammatories and corticosteroid injections versus role of various surgical interventions including 4 corner fusion versus proximal row carpectomy.  Given relatively intermittent symptoms at this time patient will continue with observation.  If this does become more symptomatic then he will return for follow-up and repeat assessment to discuss additional interventions.

## 2024-09-17 DIAGNOSIS — I10 HTN (HYPERTENSION), BENIGN: ICD-10-CM

## 2024-09-17 RX ORDER — METOPROLOL TARTRATE 50 MG/1
50 TABLET ORAL 2 TIMES DAILY
Qty: 180 TABLET | Refills: 1 | Status: SHIPPED | OUTPATIENT
Start: 2024-09-17

## 2024-09-20 ENCOUNTER — PROCEDURE VISIT (OUTPATIENT)
Dept: DERMATOLOGY | Facility: CLINIC | Age: 64
End: 2024-09-20
Payer: MEDICARE

## 2024-09-20 DIAGNOSIS — D48.5 NEOPLASM OF UNCERTAIN BEHAVIOR OF SKIN: ICD-10-CM

## 2024-09-20 PROCEDURE — 11402 EXC TR-EXT B9+MARG 1.1-2 CM: CPT | Performed by: STUDENT IN AN ORGANIZED HEALTH CARE EDUCATION/TRAINING PROGRAM

## 2024-09-20 PROCEDURE — 12031 INTMD RPR S/A/T/EXT 2.5 CM/<: CPT | Performed by: STUDENT IN AN ORGANIZED HEALTH CARE EDUCATION/TRAINING PROGRAM

## 2024-09-20 NOTE — PROGRESS NOTES
Excision Operative Note    Date of Surgery:  9/20/2024  Surgeon:  Kenneth Gongora MD  Office Location: [address]   [facility] [address]  [facility] [address]  Dept: [phone]  Dept Fax: [phone]  Referring Provider: Sekou Davis MD  [address] [place] [address]   [facility], [facility] [address]  [place] [id number]    Nirmal Pichardo is a 63 y.o. male who presents for the following: Excision for neoplasm of uncertain behavior of skin.    According to the patient, the lesion has been present for approximately greater than 1 year at the time of diagnosis.  The lesion is not causing symptoms.  The lesion has not been treated previously.    The patient does not have a pacemaker / defibrillator.  The patient does have a heart valve / joint replacement.    The patient is on blood thinners.   The patient does not have a history of hepatitis B or C.  The patient does not have a history of HIV.  The patient does not have a history of immunosuppression (e.g. organ transplantation, malignancy, medications)    Is it okay to leave a phone message with results? yes    Assessment/Plan   Pre-procedure:   Obtained informed consent: written from patient  The surgical site was identified and confirmed with the patient.     Intra-operative:   Audible time out called at 1:10PM 09/20/24  by: Kenneth Gongora MD   Verified patient name, birthdate, site, specimen bottle label & requisition.    The planned procedure(s) was again reviewed with the patient. The risks of bleeding, infection, nerve damage and scarring were reviewed. The patient identity, surgical site, and planned procedure(s) were verified.     Biopsy Accession Number:   Neoplasm of uncertain behavior of skin  Left Suprapubic Area    Skin excision    Lesion length (cm):  1  Lesion width (cm):  1  Total excision diameter (cm):  1  Informed consent: discussed and consent obtained    Timeout: patient name, date of birth, surgical site,  and procedure verified    Procedure prep:  Patient prepped in sterile fashion  Anesthesia: the lesion was anesthetized in a standard fashion    Anesthetic:  1% lidocaine w/ epinephrine 1-100,000 local infiltration  Instrument used: #15 blade    Hemostasis achieved with: electrodesiccation    Outcome: patient tolerated procedure well with no complications    Post-procedure details: sterile dressing applied and wound care instructions given    Dressing type: pressure dressing, petrolatum and Telfa pad    Additional details:  The possible diagnoses were explained. Although the lesion is likely benign, the patient requests removal of the lesion because of the symptoms it is causing. Excision was discussed with the patient. The risks, benefits and potential adverse effects were reviewed. Discussion included but was not limited to the cure rate, relative cost, wound care requirements, activity restrictions, likely scar outcome and time to heal were reviewed. Alternative options including monitoring the lesion were discussed. The patient elected to proceed with excision.     Skin repair  Complexity:  Intermediate  Final length (cm):  1.5  Informed consent: discussed and consent obtained    Timeout: patient name, date of birth, surgical site, and procedure verified    Procedure prep:  Patient prepped in sterile fashion  Anesthesia: the lesion was anesthetized in a standard fashion    Reason for type of repair: reduce tension to allow closure    Subcutaneous layers (deep stitches):   Suture size:  4-0  Suture type: Vicryl (polyglactin 910)    Stitches:  Buried vertical mattress  Fine/surface layer approximation (top stitches):   Suture size:  5-0  Suture type: fast-absorbing plain gut    Stitches: simple running    Hemostasis achieved with: electrodesiccation  Outcome: patient tolerated procedure well with no complications    Post-procedure details: sterile dressing applied and wound care instructions given    Dressing type:  petrolatum and pressure dressing      Staff Communication: Dermatology Local Anesthesia: Site Location:   Left Suprapubic 1 % Lidocaine / Epinephrine - Amount: 6 CC    Specimen 1 - Dermatopathology- DERM LAB  Differential Diagnosis: NUB  Check Margins Yes/No?:  No  Comments:    Dermpath Lab: Routine Histopathology (formalin-fixed tissue)      Intermediate Linear Repair:  Given the location and size of the defect, it was determined that an intermediate layered linear closure was required to restore normal anatomy and function. The repair is an intermediate closure as two layers of sutures were required. The defect was undermined extensively at the level of the subcutaneous plane. Standing cutaneous cones were removed using Burow's triangles. The wound edges were brought into close approximation with buried vertical mattress sutures. The remainder of the wound was then closed with epidermal top sutures.    The final repair measured 1.5 cm    Wound care was discussed, and the patient was given written post-operative wound care instructions.      The patient will follow up with Kenneth Gongora MD as needed for any post operative problems or concerns, and will follow up with their primary dermatologist as scheduled.      Kenneth Gongora MD, PGY-5  , Department of Dermatology  Micrographic Surgery and Cutaneous Oncology Fellow  9/20/2024

## 2024-09-23 DIAGNOSIS — I10 HTN (HYPERTENSION), BENIGN: ICD-10-CM

## 2024-09-23 RX ORDER — LOSARTAN POTASSIUM 100 MG/1
100 TABLET ORAL DAILY
Qty: 90 TABLET | Refills: 0 | Status: SHIPPED | OUTPATIENT
Start: 2024-09-23

## 2024-09-23 NOTE — TELEPHONE ENCOUNTER
PEND MED   Patient needs refill of :    LOSARTAN     Pharmacy : [facility]     LAST appointment : 08-     Thanks...

## 2024-09-24 LAB
LABORATORY COMMENT REPORT: NORMAL
PATH REPORT.FINAL DX SPEC: NORMAL
PATH REPORT.GROSS SPEC: NORMAL
PATH REPORT.MICROSCOPIC SPEC OTHER STN: NORMAL
PATH REPORT.RELEVANT HX SPEC: NORMAL
PATH REPORT.TOTAL CANCER: NORMAL

## 2024-10-15 DIAGNOSIS — E78.2 MIXED HYPERLIPIDEMIA: ICD-10-CM

## 2024-10-15 RX ORDER — ATORVASTATIN CALCIUM 10 MG/1
10 TABLET, FILM COATED ORAL DAILY
Qty: 90 TABLET | Refills: 0 | Status: SHIPPED | OUTPATIENT
Start: 2024-10-15

## 2024-10-15 NOTE — TELEPHONE ENCOUNTER
Medication Name:ATROVASTASTIN  Dose: 10MG   Frequency:  Quantity left: 1 TAB DAILY   Pharmacy: [facility]     Last appointment: 8/2/24  Last CPE:  Last MCW:  Next appointment: 8/2/24  Next CPE:  Next MCW:

## 2024-11-06 ENCOUNTER — TELEPHONE (OUTPATIENT)
Dept: PRIMARY CARE | Facility: CLINIC | Age: 64
End: 2024-11-06
Payer: MEDICARE

## 2024-11-06 DIAGNOSIS — E11.9 TYPE 2 DIABETES MELLITUS WITHOUT COMPLICATION, WITHOUT LONG-TERM CURRENT USE OF INSULIN (MULTI): ICD-10-CM

## 2024-11-06 RX ORDER — METFORMIN HYDROCHLORIDE 500 MG/1
500 TABLET ORAL
Qty: 90 TABLET | Refills: 0 | Status: SHIPPED | OUTPATIENT
Start: 2024-11-06

## 2024-11-06 NOTE — TELEPHONE ENCOUNTER
Medication Name:  Metformin 500 mg  Dose:  Frequency:  Quantity left:  Pharmacy:  [address]    Last appointment:  6/7/2024  Last CPE:  Last MCW:  Next appointment: Not scheduled  Next CPE:  Next MCW:

## 2024-11-21 ENCOUNTER — APPOINTMENT (OUTPATIENT)
Dept: PRIMARY CARE | Facility: CLINIC | Age: 64
End: 2024-11-21
Payer: COMMERCIAL

## 2024-11-26 ENCOUNTER — APPOINTMENT (OUTPATIENT)
Dept: ORTHOPEDIC SURGERY | Facility: CLINIC | Age: 64
End: 2024-11-26
Payer: MEDICARE

## 2024-11-26 DIAGNOSIS — M25.532 WRIST PAIN, CHRONIC, LEFT: Primary | ICD-10-CM

## 2024-11-26 DIAGNOSIS — G89.29 WRIST PAIN, CHRONIC, LEFT: Primary | ICD-10-CM

## 2024-11-26 PROCEDURE — 3044F HG A1C LEVEL LT 7.0%: CPT | Performed by: ORTHOPAEDIC SURGERY

## 2024-11-26 PROCEDURE — 3048F LDL-C <100 MG/DL: CPT | Performed by: ORTHOPAEDIC SURGERY

## 2024-11-26 PROCEDURE — 3061F NEG MICROALBUMINURIA REV: CPT | Performed by: ORTHOPAEDIC SURGERY

## 2024-11-26 PROCEDURE — 99213 OFFICE O/P EST LOW 20 MIN: CPT | Performed by: ORTHOPAEDIC SURGERY

## 2024-11-26 PROCEDURE — 4010F ACE/ARB THERAPY RXD/TAKEN: CPT | Performed by: ORTHOPAEDIC SURGERY

## 2024-11-26 ASSESSMENT — PAIN - FUNCTIONAL ASSESSMENT: PAIN_FUNCTIONAL_ASSESSMENT: NO/DENIES PAIN

## 2024-11-29 NOTE — PROGRESS NOTES
History of Present Illness:  Chief Complaint   Patient presents with    Left Wrist - Follow-up     [facility] wrist      Patient presents for repeat evaluation of left [facility] wrist.  Pain has been relatively manageable with intermittent use of his brace.  Still with occasional sharp pains and underlying aching pain.  Doing some degree of activity modifications.    No numbness or tingling.    Past Medical History:   Diagnosis Date    Diverticulitis of intestine, part unspecified, without perforation or abscess without bleeding     Diverticulitis    Epigastric pain 01/31/2017    Dyspepsia    Nonrheumatic aortic (valve) insufficiency     Severe aortic regurgitation    Other forms of dyspnea 02/11/2021    Dyspnea on exertion    Other hemorrhoids     Internal hemorrhoid    Pain in left wrist 01/30/2018    Chronic pain of left wrist    Personal history of colonic polyps 01/30/2018    History of colonic polyps    Personal history of other specified conditions 02/11/2021    History of chest pain    Plantar fascial fibromatosis 01/30/2018    Plantar fasciitis    Presence of other vascular implants and grafts 07/13/2021    S/P ascending aortic replacement    Presence of prosthetic heart valve 05/13/2022    S/P aortic valve replacement    Rheumatic tricuspid insufficiency     Tricuspid regurgitation       Medication Documentation Review Audit       Reviewed by Verenice Scales CMA (Medical Assistant) on 11/26/24 at 1035      Medication Order Taking? Sig Documenting Provider Last Dose Status   acetaminophen (Tylenol) 325 mg tablet [id number] No Take 1 tablet (325 mg) by mouth every 6 hours if needed for moderate pain (4 - 6). Historical Provider, MD Taking Active   aspirin 81 mg EC tablet [id number] No Take 1 tablet (81 mg) by mouth once daily. Historical Provider, MD Taking Active   atorvastatin (Lipitor) 10 mg tablet [id number]  Take 1 tablet (10 mg) by mouth once daily. Lindsey Rock Loida, DO  Active   cholecalciferol (Vitamin D-3) 50  MCG (2000 UT) tablet [id number] No Take 1 tablet (2,000 Units) by mouth once daily. Historical Provider, MD Taking Active   clindamycin (Cleocin T) 1 % lotion [id number]  Apply 1 Application topically 2 times a day. To neck, pubic area, and cyst on abdominal fold as needed for bumps Sekou Davis MD  Active   clobetasol (Temovate) 0.05 % ointment [id number] No Apply 1 Application topically 2 times a day. Apply and gently massage topically to the affected area twice a day. Historical Provider, MD Taking Active   empagliflozin (Jardiance) 10 mg [id number] No Take 1 tablet (10 mg) by mouth once daily. WILLIAM Santiago Taking Active   losartan (Cozaar) 100 mg tablet [id number]  Take 1 tablet (100 mg) by mouth once daily. Lindsey CARMONA Pranav Loida, DO  Active   metFORMIN (Glucophage) 500 mg tablet [id number]  Take 1 tablet (500 mg) by mouth once daily with breakfast. Lindsey CARMONA Pranav Loida, DO  Active   metoprolol tartrate (Lopressor) 50 mg tablet [id number]  Take 1 tablet by mouth 2 times a day. WILLIAM Santiago  Active   multivit-iron-minerals-folic acid (Centrum Silver) 0.4 mg-300 mcg- 250 mcg tab [id number] No Take 1 tablet by mouth once daily. Historical Provider, MD Taking Active   neomycin-bacitracnZn-polymyxnB 3.5-500-10,000 mg-unit-unit ointment [id number] No once daily. Historical Provider, MD Taking Active                    Allergies   Allergen Reactions    Acyclovir Unknown     Per Dr. Reinoso, avoid.    Chocolate Other    Cocoa Unknown    Lisinopril Unknown    Raw Vegetable Unknown     Any and all forms of tomatoes!    Tomato Other       Social History     Socioeconomic History    Marital status:      Spouse name: Not on file    Number of children: Not on file    Years of education: Not on file    Highest education level: Not on file   Occupational History    Not on file   Tobacco Use    Smoking status: Never     Passive exposure: Never    Smokeless tobacco: Never   Substance and Sexual Activity     Alcohol use: Not Currently    Drug use: Not Currently    Sexual activity: Not on file   Other Topics Concern    Not on file   Social History Narrative    Not on file     Social Drivers of Health     Financial Resource Strain: Not on file   Food Insecurity: Not on file   Transportation Needs: Not on file   Physical Activity: Not on file   Stress: Not on file   Social Connections: Not on file   Intimate Partner Violence: Not on file   Housing Stability: Not on file       Past Surgical History:   Procedure Laterality Date    CT ANGIO CORONARY ART WITH HEARTFLOW IF SCORE >30%  3/5/2021    CT HEART CORONARY ANGIOGRAM 3/5/2021 [facility]ICAL [facility]GAC[facility]    OTHER SURGICAL HISTORY  08/13/2019    Complete colonoscopy    OTHER SURGICAL HISTORY  08/10/2021    Aortic valve replacement    OTHER SURGICAL HISTORY  08/10/2021    Heart surgery        Review of Systems   GENERAL: Negative for malaise, significant weight loss, fever  MUSCULOSKELETAL: see HPI  NEURO:  Negative     Physical Examination  Constitutional: Appears well-developed and well-nourished.  Head: Normocephalic and atraumatic.  Eyes: EOMI grossly  Cardiovascular: Intact distal pulses.   Respiratory: Effort normal. No respiratory distress.  Neurologic: Alert and oriented to person, place, and time.  Skin: Skin is warm and dry.  Hematologic / Lymphatic: No lymphedema, lymphangitis.  Psychiatric: normal mood and affect. Behavior is normal.   Musculoskeletal:  Left wrist: 60/60 degrees flexion/extension.  80/80 degrees pronation/supination.  Mild synovitis about the dorsal/radial aspect of the wrist with mild associated tenderness.  Sensation grossly intact distally.  2+ radial pulse.  Positive NAHOMY.  Negative DRUJ/TFCC       Assessment:  Patient with [facility] wrist and intermittent associated symptoms    Plan: We once again reviewed diagnosis and risks and benefits of various treatment options.  As patient is still managing with his regular activities he would like to continue  with observation and bracing at this time.  He will follow-up in the future if he becomes more symptomatic and wishes to proceed with additional interventions.

## 2024-12-17 ENCOUNTER — APPOINTMENT (OUTPATIENT)
Dept: PRIMARY CARE | Facility: CLINIC | Age: 64
End: 2024-12-17
Payer: COMMERCIAL

## 2024-12-17 VITALS
HEART RATE: 64 BPM | DIASTOLIC BLOOD PRESSURE: 84 MMHG | BODY MASS INDEX: 36.07 KG/M2 | SYSTOLIC BLOOD PRESSURE: 124 MMHG | TEMPERATURE: 97.4 F | HEIGHT: 67 IN | OXYGEN SATURATION: 97 % | WEIGHT: 229.8 LBS

## 2024-12-17 DIAGNOSIS — E11.9 TYPE 2 DIABETES MELLITUS WITHOUT COMPLICATION, WITHOUT LONG-TERM CURRENT USE OF INSULIN (MULTI): Primary | ICD-10-CM

## 2024-12-17 DIAGNOSIS — I10 HTN (HYPERTENSION), BENIGN: ICD-10-CM

## 2024-12-17 PROBLEM — B07.9 WART OF HAND: Status: RESOLVED | Noted: 2023-03-22 | Resolved: 2024-12-17

## 2024-12-17 PROBLEM — L72.3 SEBACEOUS CYST: Status: RESOLVED | Noted: 2024-08-02 | Resolved: 2024-12-17

## 2024-12-17 PROBLEM — K57.92 DIVERTICULITIS: Status: RESOLVED | Noted: 2024-05-16 | Resolved: 2024-12-17

## 2024-12-17 PROBLEM — Z00.00 ENCOUNTER FOR ANNUAL PHYSICAL EXAM: Status: RESOLVED | Noted: 2023-04-05 | Resolved: 2024-12-17

## 2024-12-17 PROBLEM — N18.31 STAGE 3A CHRONIC KIDNEY DISEASE (MULTI): Status: RESOLVED | Noted: 2024-05-16 | Resolved: 2024-12-17

## 2024-12-17 PROBLEM — S20.219A CONTUSION OF CHEST WALL: Status: RESOLVED | Noted: 2024-05-16 | Resolved: 2024-12-17

## 2024-12-17 PROCEDURE — 1036F TOBACCO NON-USER: CPT | Performed by: STUDENT IN AN ORGANIZED HEALTH CARE EDUCATION/TRAINING PROGRAM

## 2024-12-17 PROCEDURE — 3079F DIAST BP 80-89 MM HG: CPT | Performed by: STUDENT IN AN ORGANIZED HEALTH CARE EDUCATION/TRAINING PROGRAM

## 2024-12-17 PROCEDURE — 3048F LDL-C <100 MG/DL: CPT | Performed by: STUDENT IN AN ORGANIZED HEALTH CARE EDUCATION/TRAINING PROGRAM

## 2024-12-17 PROCEDURE — 99214 OFFICE O/P EST MOD 30 MIN: CPT | Performed by: STUDENT IN AN ORGANIZED HEALTH CARE EDUCATION/TRAINING PROGRAM

## 2024-12-17 PROCEDURE — 3074F SYST BP LT 130 MM HG: CPT | Performed by: STUDENT IN AN ORGANIZED HEALTH CARE EDUCATION/TRAINING PROGRAM

## 2024-12-17 PROCEDURE — 4010F ACE/ARB THERAPY RXD/TAKEN: CPT | Performed by: STUDENT IN AN ORGANIZED HEALTH CARE EDUCATION/TRAINING PROGRAM

## 2024-12-17 PROCEDURE — 3008F BODY MASS INDEX DOCD: CPT | Performed by: STUDENT IN AN ORGANIZED HEALTH CARE EDUCATION/TRAINING PROGRAM

## 2024-12-17 PROCEDURE — 3044F HG A1C LEVEL LT 7.0%: CPT | Performed by: STUDENT IN AN ORGANIZED HEALTH CARE EDUCATION/TRAINING PROGRAM

## 2024-12-17 PROCEDURE — 3061F NEG MICROALBUMINURIA REV: CPT | Performed by: STUDENT IN AN ORGANIZED HEALTH CARE EDUCATION/TRAINING PROGRAM

## 2024-12-17 RX ORDER — IBUPROFEN 100 MG/5ML
1000 SUSPENSION, ORAL (FINAL DOSE FORM) ORAL DAILY
COMMUNITY

## 2024-12-17 RX ORDER — LOSARTAN POTASSIUM 100 MG/1
100 TABLET ORAL DAILY
Qty: 90 TABLET | Refills: 3 | Status: SHIPPED | OUTPATIENT
Start: 2024-12-17

## 2024-12-17 ASSESSMENT — ENCOUNTER SYMPTOMS
OCCASIONAL FEELINGS OF UNSTEADINESS: 0
DIZZINESS: 0
CHILLS: 0
DEPRESSION: 0
POLYDIPSIA: 0
LIGHT-HEADEDNESS: 0
HEADACHES: 0
LOSS OF SENSATION IN FEET: 0
SHORTNESS OF BREATH: 0
FEVER: 0
RHINORRHEA: 0

## 2024-12-17 NOTE — PROGRESS NOTES
Assessment/Plan   Problem List Items Addressed This Visit             ICD-10-CM    Type 2 diabetes mellitus without complication, without long-term current use of insulin (Multi) - Primary E11.9    Relevant Orders    CBC    Comprehensive Metabolic Panel    Lipid Panel    Hemoglobin A1C    Referral to Ophthalmology    HTN (hypertension), benign I10    Relevant Medications    losartan (Cozaar) 100 mg tablet    Other Relevant Orders    CBC    Comprehensive Metabolic Panel    Lipid Panel    Hemoglobin A1C       Subjective   Patient ID: Germain Pichardo is a 64 y.o. male who presents for Diabetes.  HPI  HTN  -BP well controlled, patient on losartan 100 mg every day, lopressor 50 mg BID    DM2  Patient is on jardiance 10 mg every day, metformin 500 mg every day. Last A1C well controlled at 6.6%.     Review of Systems   Constitutional:  Negative for chills and fever.   HENT:  Negative for congestion and rhinorrhea.    Respiratory:  Negative for shortness of breath.    Cardiovascular:  Negative for chest pain.   Endocrine: Negative for polydipsia and polyuria.   Neurological:  Negative for dizziness, light-headedness and headaches.       Objective   Physical Exam  Constitutional:       General: He is not in acute distress.     Appearance: Normal appearance. He is not ill-appearing.   HENT:      Head: Normocephalic and atraumatic.   Eyes:      Extraocular Movements: Extraocular movements intact.   Cardiovascular:      Rate and Rhythm: Normal rate and regular rhythm.   Pulmonary:      Effort: Pulmonary effort is normal. No respiratory distress.      Breath sounds: Normal breath sounds. No stridor. No wheezing, rhonchi or rales.   Neurological:      Mental Status: He is alert.               Tong Umana MD 12/17/24 1:32 PM

## 2024-12-30 ENCOUNTER — APPOINTMENT (OUTPATIENT)
Dept: DERMATOLOGY | Facility: CLINIC | Age: 64
End: 2024-12-30
Payer: MEDICARE

## 2025-01-17 ENCOUNTER — PATIENT MESSAGE (OUTPATIENT)
Dept: PRIMARY CARE | Facility: CLINIC | Age: 65
End: 2025-01-17
Payer: MEDICARE

## 2025-01-17 DIAGNOSIS — E78.2 MIXED HYPERLIPIDEMIA: ICD-10-CM

## 2025-01-17 RX ORDER — ATORVASTATIN CALCIUM 10 MG/1
10 TABLET, FILM COATED ORAL DAILY
Qty: 90 TABLET | Refills: 3 | Status: SHIPPED | OUTPATIENT
Start: 2025-01-17

## 2025-02-04 LAB
ANION GAP SERPL CALCULATED.4IONS-SCNC: 8 MMOL/L (CALC) (ref 7–17)
BUN SERPL-MCNC: 14 MG/DL (ref 7–25)
BUN/CREAT SERPL: ABNORMAL (CALC) (ref 6–22)
CALCIUM SERPL-MCNC: 9.6 MG/DL (ref 8.6–10.3)
CHLORIDE SERPL-SCNC: 105 MMOL/L (ref 98–110)
CO2 SERPL-SCNC: 27 MMOL/L (ref 20–32)
CREAT SERPL-MCNC: 1.13 MG/DL (ref 0.7–1.35)
EGFRCR SERPLBLD CKD-EPI 2021: 73 ML/MIN/1.73M2
GLUCOSE SERPL-MCNC: 105 MG/DL (ref 65–99)
POTASSIUM SERPL-SCNC: 4.5 MMOL/L (ref 3.5–5.3)
SODIUM SERPL-SCNC: 140 MMOL/L (ref 135–146)

## 2025-02-05 DIAGNOSIS — I50.22 CHRONIC SYSTOLIC (CONGESTIVE) HEART FAILURE: ICD-10-CM

## 2025-02-05 DIAGNOSIS — I42.9 CARDIOMYOPATHY, UNSPECIFIED TYPE (MULTI): ICD-10-CM

## 2025-02-07 ENCOUNTER — PATIENT MESSAGE (OUTPATIENT)
Dept: PRIMARY CARE | Facility: CLINIC | Age: 65
End: 2025-02-07
Payer: MEDICARE

## 2025-02-07 DIAGNOSIS — I50.22 CHRONIC SYSTOLIC (CONGESTIVE) HEART FAILURE: ICD-10-CM

## 2025-02-07 DIAGNOSIS — E78.2 MIXED HYPERLIPIDEMIA: ICD-10-CM

## 2025-02-07 DIAGNOSIS — I42.9 CARDIOMYOPATHY, UNSPECIFIED TYPE (MULTI): ICD-10-CM

## 2025-02-07 DIAGNOSIS — I10 HTN (HYPERTENSION), BENIGN: ICD-10-CM

## 2025-02-07 DIAGNOSIS — E11.9 TYPE 2 DIABETES MELLITUS WITHOUT COMPLICATION, WITHOUT LONG-TERM CURRENT USE OF INSULIN (MULTI): ICD-10-CM

## 2025-02-07 RX ORDER — METFORMIN HYDROCHLORIDE 500 MG/1
500 TABLET ORAL
Qty: 90 TABLET | Refills: 3 | Status: SHIPPED | OUTPATIENT
Start: 2025-02-07

## 2025-02-07 RX ORDER — ATORVASTATIN CALCIUM 10 MG/1
10 TABLET, FILM COATED ORAL DAILY
Qty: 90 TABLET | Refills: 3 | Status: SHIPPED | OUTPATIENT
Start: 2025-02-07

## 2025-02-07 RX ORDER — LOSARTAN POTASSIUM 100 MG/1
100 TABLET ORAL DAILY
Qty: 90 TABLET | Refills: 3 | Status: SHIPPED | OUTPATIENT
Start: 2025-02-07

## 2025-02-07 RX ORDER — METOPROLOL TARTRATE 50 MG/1
50 TABLET ORAL 2 TIMES DAILY
Qty: 180 TABLET | Refills: 3 | Status: SHIPPED | OUTPATIENT
Start: 2025-02-07

## 2025-02-19 ENCOUNTER — OFFICE VISIT (OUTPATIENT)
Dept: CARDIOLOGY | Facility: CLINIC | Age: 65
End: 2025-02-19
Payer: MEDICARE

## 2025-02-19 VITALS
WEIGHT: 232 LBS | DIASTOLIC BLOOD PRESSURE: 75 MMHG | SYSTOLIC BLOOD PRESSURE: 116 MMHG | BODY MASS INDEX: 36.34 KG/M2 | HEART RATE: 72 BPM

## 2025-02-19 DIAGNOSIS — E11.9 TYPE 2 DIABETES MELLITUS WITHOUT COMPLICATION, WITHOUT LONG-TERM CURRENT USE OF INSULIN (MULTI): ICD-10-CM

## 2025-02-19 DIAGNOSIS — I10 HTN (HYPERTENSION), BENIGN: ICD-10-CM

## 2025-02-19 DIAGNOSIS — Z95.2 S/P AORTIC VALVE REPLACEMENT: ICD-10-CM

## 2025-02-19 DIAGNOSIS — I50.22 CHRONIC SYSTOLIC (CONGESTIVE) HEART FAILURE: ICD-10-CM

## 2025-02-19 DIAGNOSIS — Z98.890 H/O TRICUSPID VALVE REPAIR: ICD-10-CM

## 2025-02-19 DIAGNOSIS — I42.9 CARDIOMYOPATHY, UNSPECIFIED TYPE (MULTI): Primary | ICD-10-CM

## 2025-02-19 PROCEDURE — 3078F DIAST BP <80 MM HG: CPT | Performed by: NURSE PRACTITIONER

## 2025-02-19 PROCEDURE — 99213 OFFICE O/P EST LOW 20 MIN: CPT | Performed by: NURSE PRACTITIONER

## 2025-02-19 PROCEDURE — 4010F ACE/ARB THERAPY RXD/TAKEN: CPT | Performed by: NURSE PRACTITIONER

## 2025-02-19 PROCEDURE — 3074F SYST BP LT 130 MM HG: CPT | Performed by: NURSE PRACTITIONER

## 2025-02-19 PROCEDURE — 1036F TOBACCO NON-USER: CPT | Performed by: NURSE PRACTITIONER

## 2025-02-19 NOTE — PROGRESS NOTES
Chief Complaint:   AVR and TV repair     History Of Present Illness:    Germain Pichardo is a 64 y.o. male here with aortic valve disease s/p bioprosthetic aortic valve replacement. Underwent tricuspid valve repair as well. The patient has been well since their last appointment and has no cardiac complaints.  The patient denies chest pain, shortness of breath, or any systemic symptoms.  The patient is compliant with aspirin and antibiotic prophylaxis to prevent endocarditis.  Their most recent echocardiogram demonstrates normal prosthetic valve function.      Walks 2 miles most days. Exercises in pm with sister.     Denies SOB, CP and LE edema.     CV Surgery (AV replacement with 27mm Freestyle. Tricuspid valve repair with 36mm ring.) - 3/9/2021  Echo (EF 0.40 (40%), Severe AR) - 3/4/2021    Allergies:  Lisinopril, Acyclovir, Chocolate, Cocoa, Raw vegetable, and Tomato    Review of Systems  All pertinent systems have been reviewed and are negative except for what is stated in the history of present illness.    All other systems have been reviewed and are negative and noncontributory to this patient's current ailments.     Visit Vitals  /75 (BP Location: Right arm, Patient Position: Sitting, BP Cuff Size: Large adult)   Pulse 72   Wt 105 kg (232 lb)   BMI 36.34 kg/m²   Smoking Status Never   BSA 2.23 m²       Last Labs:  CBC -  Lab Results   Component Value Date    WBC 5.5 08/21/2024    HGB 15.8 08/21/2024    HCT 47.7 08/21/2024    MCV 72 (L) 08/21/2024     08/21/2024       CMP -  Lab Results   Component Value Date    CALCIUM 9.6 02/03/2025    PHOS 2.3 (L) 03/13/2021    PROT 7.0 08/21/2024    ALBUMIN 4.6 08/21/2024    AST 28 08/21/2024    ALT 35 08/21/2024    ALKPHOS 59 08/21/2024    BILITOT 0.7 08/21/2024       LIPID PANEL -   Lab Results   Component Value Date    CHOL 119 08/21/2024    TRIG 168 (H) 08/21/2024    HDL 38.7 08/21/2024    CHHDL 3.1 08/21/2024    LDLF 43 05/17/2023    VLDL 34 08/21/2024     NHDL 80 08/21/2024       RENAL FUNCTION PANEL -   Lab Results   Component Value Date    GLUCOSE 105 (H) 02/03/2025     02/03/2025    K 4.5 02/03/2025     02/03/2025    CO2 27 02/03/2025    ANIONGAP 8 02/03/2025    BUN 14 02/03/2025    CREATININE 1.13 02/03/2025    GFRMALE 68 03/09/2023    CALCIUM 9.6 02/03/2025    PHOS 2.3 (L) 03/13/2021    ALBUMIN 4.6 08/21/2024     Objective   Vitals reviewed.   Constitutional:       Appearance: Healthy appearance. Not in distress.   Neck:      Vascular: No JVR. JVD normal.   Pulmonary:      Effort: Pulmonary effort is normal.      Breath sounds: Normal breath sounds. No wheezing. No rhonchi. No rales.   Chest:      Chest wall: Not tender to palpatation.   Cardiovascular:      PMI at left midclavicular line. Normal rate. Regular rhythm. Normal S1. Normal S2.       Murmurs: There is no murmur.      No gallop.  No click. No rub.   Edema:     Peripheral edema absent.   Abdominal:      General: Bowel sounds are normal.      Palpations: Abdomen is soft.      Tenderness: There is no abdominal tenderness.   Musculoskeletal: Normal range of motion.         General: No tenderness. Skin:     General: Skin is warm and dry.   Neurological:      General: No focal deficit present.      Mental Status: Alert and oriented to person, place and time.       Assessment/Plan   Diagnoses and all orders for this visit:  Cardiomyopathy, unspecified type (CMS/HCC)  - Rechecking EF this year  - EF 40% last year   - he had no cardiac complaints  - euvolemic  - exercising regularly  - tolerating jardiance, continue   Chronic systolic (congestive) heart failure (CMS/HCC)  - see above  S/P aortic valve replacement  - stable  - annual echo  - abx dental work  H/O tricuspid valve repair  - stable  - annual echo  - abx dental work  HTN (hypertension), benign  - well controlled  DM II  - jardiance     Follow up for TTE   Outpatient Medications:  Current Outpatient Medications   Medication Instructions     ascorbic acid (VITAMIN C) 1,000 mg, Daily    aspirin 81 mg EC tablet 1 tablet, Daily    atorvastatin (LIPITOR) 10 mg, oral, Daily    cholecalciferol (Vitamin D-3) 50 MCG (2000 UT) tablet 1 tablet, Daily    empagliflozin (JARDIANCE) 10 mg, oral, Daily    losartan (COZAAR) 100 mg, oral, Daily    metFORMIN (GLUCOPHAGE) 500 mg, oral, Daily with breakfast    metoprolol tartrate (LOPRESSOR) 50 mg, oral, 2 times daily    multivit-iron-minerals-folic acid (Centrum Silver) 0.4 mg-300 mcg- 250 mcg tab 1 tablet, Daily         Exclusive of any other services or procedures performed, I, Marcia SANABRIA-LEON, spent 20 minutes in duration for this visit today.  This time consisted of chart review, obtaining history, and/or performing the exam as documented above, as well as, documenting the clinical information for the encounter in the electronic record, discussing treatment options, plans, and/or goals with patient, family, and/or caregiver, refilling medications, updating the electronic record, ordering medicines, lab work, imaging, referrals, and/or procedures as documented above and communicating with other [facility] professionals. I have discussed the results of laboratory, radiology, and cardiology studies with the patient and their family/caregiver.      I reviewed PCP notes, labs

## 2025-03-05 ENCOUNTER — HOSPITAL ENCOUNTER (OUTPATIENT)
Dept: CARDIOLOGY | Facility: CLINIC | Age: 65
Discharge: HOME | End: 2025-03-05
Payer: MEDICARE

## 2025-03-05 DIAGNOSIS — Z98.890 H/O TRICUSPID VALVE REPAIR: ICD-10-CM

## 2025-03-05 DIAGNOSIS — I50.22 CHRONIC SYSTOLIC (CONGESTIVE) HEART FAILURE: ICD-10-CM

## 2025-03-05 DIAGNOSIS — Z95.2 S/P AORTIC VALVE REPLACEMENT: ICD-10-CM

## 2025-03-05 DIAGNOSIS — I42.9 CARDIOMYOPATHY, UNSPECIFIED TYPE (MULTI): ICD-10-CM

## 2025-03-05 LAB
AORTIC VALVE MEAN GRADIENT: 5 MMHG
AORTIC VALVE PEAK VELOCITY: 1.44 M/S
AV PEAK GRADIENT: 8 MMHG
EJECTION FRACTION APICAL 4 CHAMBER: 50
EJECTION FRACTION: 39 %
LEFT ATRIUM VOLUME AREA LENGTH INDEX BSA: 37.6 ML/M2
LEFT VENTRICLE INTERNAL DIMENSION DIASTOLE: 5.76 CM (ref 3.5–6)
MITRAL VALVE E/A RATIO: 1.16
RIGHT VENTRICLE FREE WALL PEAK S': 5 CM/S
TRICUSPID ANNULAR PLANE SYSTOLIC EXCURSION: 1.6 CM

## 2025-03-05 PROCEDURE — C8929 TTE W OR WO FOL WCON,DOPPLER: HCPCS

## 2025-03-05 PROCEDURE — 93306 TTE W/DOPPLER COMPLETE: CPT | Performed by: INTERNAL MEDICINE

## 2025-03-05 PROCEDURE — 2500000004 HC RX 250 GENERAL PHARMACY W/ HCPCS (ALT 636 FOR OP/ED): Performed by: INTERNAL MEDICINE

## 2025-03-05 RX ADMIN — PERFLUTREN 2 ML OF DILUTION: 6.52 INJECTION, SUSPENSION INTRAVENOUS at 07:41

## 2025-03-25 ENCOUNTER — APPOINTMENT (OUTPATIENT)
Dept: OPHTHALMOLOGY | Facility: CLINIC | Age: 65
End: 2025-03-25
Payer: MEDICARE

## 2025-04-01 ENCOUNTER — APPOINTMENT (OUTPATIENT)
Dept: PRIMARY CARE | Facility: CLINIC | Age: 65
End: 2025-04-01
Payer: MEDICARE

## 2025-04-01 VITALS
WEIGHT: 233.6 LBS | TEMPERATURE: 97.8 F | BODY MASS INDEX: 36.59 KG/M2 | SYSTOLIC BLOOD PRESSURE: 130 MMHG | DIASTOLIC BLOOD PRESSURE: 80 MMHG

## 2025-04-01 DIAGNOSIS — I10 HTN (HYPERTENSION), BENIGN: ICD-10-CM

## 2025-04-01 DIAGNOSIS — G47.33 OSA (OBSTRUCTIVE SLEEP APNEA): Primary | ICD-10-CM

## 2025-04-01 PROCEDURE — 3075F SYST BP GE 130 - 139MM HG: CPT | Performed by: STUDENT IN AN ORGANIZED HEALTH CARE EDUCATION/TRAINING PROGRAM

## 2025-04-01 PROCEDURE — 1036F TOBACCO NON-USER: CPT | Performed by: STUDENT IN AN ORGANIZED HEALTH CARE EDUCATION/TRAINING PROGRAM

## 2025-04-01 PROCEDURE — 3079F DIAST BP 80-89 MM HG: CPT | Performed by: STUDENT IN AN ORGANIZED HEALTH CARE EDUCATION/TRAINING PROGRAM

## 2025-04-01 PROCEDURE — 4010F ACE/ARB THERAPY RXD/TAKEN: CPT | Performed by: STUDENT IN AN ORGANIZED HEALTH CARE EDUCATION/TRAINING PROGRAM

## 2025-04-01 PROCEDURE — 99214 OFFICE O/P EST MOD 30 MIN: CPT | Performed by: STUDENT IN AN ORGANIZED HEALTH CARE EDUCATION/TRAINING PROGRAM

## 2025-04-01 RX ORDER — METOPROLOL SUCCINATE 50 MG/1
50 TABLET, EXTENDED RELEASE ORAL DAILY
Qty: 90 TABLET | Refills: 3 | Status: SHIPPED | OUTPATIENT
Start: 2025-04-01 | End: 2026-03-27

## 2025-04-01 ASSESSMENT — ENCOUNTER SYMPTOMS
DIZZINESS: 0
COUGH: 0
SHORTNESS OF BREATH: 0
HEADACHES: 0
CHILLS: 0
FEVER: 0
LIGHT-HEADEDNESS: 0

## 2025-04-01 NOTE — PROGRESS NOTES
Assessment/Plan   Assessment & Plan  HONEY (obstructive sleep apnea)    Orders:    CPAP therapy  chronic, stable  HTN (hypertension), benign    Orders:    metoprolol succinate XL (Toprol-XL) 50 mg 24 hr tablet; Take 1 tablet (50 mg) by mouth once daily. Do not crush or chew.    Chronic, stable    Subjective   Patient ID: Germain Pichardo is a 64 y.o. male who presents for Follow-up (CPap).  HPI  HONYE  He had sleep study 9/2020 which showed severe sleep apnea, recommended use CPAP with setting 12 cmH20 using F&P vitera/medium mask. Discussed use and benefits of PAP equipment, patient voiced understanding.   HTN  -On lopressor 50 mg BID discussed switching to toprol xl once daily instead.  -He is also on losartan 100 mg every day  -BP well controlled today  Review of Systems   Constitutional:  Negative for chills and fever.   HENT:  Negative for congestion.    Respiratory:  Negative for cough and shortness of breath.    Cardiovascular:  Negative for chest pain.   Neurological:  Negative for dizziness, light-headedness and headaches.       Objective   Physical Exam  Constitutional:       General: He is not in acute distress.     Appearance: Normal appearance. He is not ill-appearing.   HENT:      Head: Normocephalic and atraumatic.   Eyes:      Extraocular Movements: Extraocular movements intact.   Cardiovascular:      Rate and Rhythm: Normal rate and regular rhythm.   Pulmonary:      Effort: Pulmonary effort is normal. No respiratory distress.      Breath sounds: Normal breath sounds. No stridor. No wheezing, rhonchi or rales.   Neurological:      Mental Status: He is alert.               Tong Umana MD 04/01/25 3:00 PM

## 2025-04-01 NOTE — ASSESSMENT & PLAN NOTE
Orders:    metoprolol succinate XL (Toprol-XL) 50 mg 24 hr tablet; Take 1 tablet (50 mg) by mouth once daily. Do not crush or chew.

## 2025-05-02 LAB
ALBUMIN SERPL-MCNC: 4.8 G/DL (ref 3.6–5.1)
ALP SERPL-CCNC: 75 U/L (ref 35–144)
ALT SERPL-CCNC: 33 U/L (ref 9–46)
ANION GAP SERPL CALCULATED.4IONS-SCNC: 10 MMOL/L (CALC) (ref 7–17)
AST SERPL-CCNC: 21 U/L (ref 10–35)
BILIRUB SERPL-MCNC: 0.6 MG/DL (ref 0.2–1.2)
BUN SERPL-MCNC: 18 MG/DL (ref 7–25)
CALCIUM SERPL-MCNC: 9.4 MG/DL (ref 8.6–10.3)
CHLORIDE SERPL-SCNC: 106 MMOL/L (ref 98–110)
CHOLEST SERPL-MCNC: 116 MG/DL
CHOLEST/HDLC SERPL: 3.3 (CALC)
CO2 SERPL-SCNC: 22 MMOL/L (ref 20–32)
CREAT SERPL-MCNC: 1.2 MG/DL (ref 0.7–1.35)
EGFRCR SERPLBLD CKD-EPI 2021: 68 ML/MIN/1.73M2
ERYTHROCYTE [DISTWIDTH] IN BLOOD BY AUTOMATED COUNT: 18.5 % (ref 11–15)
EST. AVERAGE GLUCOSE BLD GHB EST-MCNC: 146 MG/DL
EST. AVERAGE GLUCOSE BLD GHB EST-SCNC: 8.1 MMOL/L
GLUCOSE SERPL-MCNC: 133 MG/DL (ref 65–99)
HBA1C MFR BLD: 6.7 %
HCT VFR BLD AUTO: 52.3 % (ref 38.5–50)
HDLC SERPL-MCNC: 35 MG/DL
HGB BLD-MCNC: 17.3 G/DL (ref 13.2–17.1)
LDLC SERPL CALC-MCNC: 53 MG/DL (CALC)
MCH RBC QN AUTO: 24.6 PG (ref 27–33)
MCHC RBC AUTO-ENTMCNC: 33.1 G/DL (ref 32–36)
MCV RBC AUTO: 74.3 FL (ref 80–100)
NONHDLC SERPL-MCNC: 81 MG/DL (CALC)
PLATELET # BLD AUTO: 195 THOUSAND/UL (ref 140–400)
PMV BLD REES-ECKER: 10.1 FL (ref 7.5–12.5)
POTASSIUM SERPL-SCNC: 4.6 MMOL/L (ref 3.5–5.3)
PROT SERPL-MCNC: 7.3 G/DL (ref 6.1–8.1)
RBC # BLD AUTO: 7.04 MILLION/UL (ref 4.2–5.8)
SODIUM SERPL-SCNC: 138 MMOL/L (ref 135–146)
TRIGL SERPL-MCNC: 214 MG/DL
WBC # BLD AUTO: 5.4 THOUSAND/UL (ref 3.8–10.8)

## 2025-05-02 NOTE — RESULT ENCOUNTER NOTE
Reviewed labs will discuss further with patient at appointment 5/7. Triglycerides elevated and A1C stable in diabetic range 6.7%.

## 2025-05-06 ENCOUNTER — APPOINTMENT (OUTPATIENT)
Dept: PRIMARY CARE | Facility: CLINIC | Age: 65
End: 2025-05-06
Payer: MEDICARE

## 2025-05-07 ENCOUNTER — APPOINTMENT (OUTPATIENT)
Dept: PRIMARY CARE | Facility: CLINIC | Age: 65
End: 2025-05-07
Payer: COMMERCIAL

## 2025-05-07 VITALS
HEART RATE: 89 BPM | BODY MASS INDEX: 35.82 KG/M2 | WEIGHT: 228.2 LBS | SYSTOLIC BLOOD PRESSURE: 106 MMHG | TEMPERATURE: 97.4 F | HEIGHT: 67 IN | OXYGEN SATURATION: 94 % | DIASTOLIC BLOOD PRESSURE: 70 MMHG

## 2025-05-07 DIAGNOSIS — R14.2 BELCHING: ICD-10-CM

## 2025-05-07 DIAGNOSIS — Z00.00 ROUTINE GENERAL MEDICAL EXAMINATION AT HEALTH CARE FACILITY: Primary | ICD-10-CM

## 2025-05-07 DIAGNOSIS — D58.2 ELEVATED HEMOGLOBIN: ICD-10-CM

## 2025-05-07 DIAGNOSIS — H61.22 IMPACTED CERUMEN, LEFT EAR: ICD-10-CM

## 2025-05-07 DIAGNOSIS — E11.9 TYPE 2 DIABETES MELLITUS WITHOUT COMPLICATION, WITHOUT LONG-TERM CURRENT USE OF INSULIN: ICD-10-CM

## 2025-05-07 PROCEDURE — G0444 DEPRESSION SCREEN ANNUAL: HCPCS | Performed by: STUDENT IN AN ORGANIZED HEALTH CARE EDUCATION/TRAINING PROGRAM

## 2025-05-07 PROCEDURE — G2211 COMPLEX E/M VISIT ADD ON: HCPCS | Performed by: STUDENT IN AN ORGANIZED HEALTH CARE EDUCATION/TRAINING PROGRAM

## 2025-05-07 PROCEDURE — 90677 PCV20 VACCINE IM: CPT | Performed by: STUDENT IN AN ORGANIZED HEALTH CARE EDUCATION/TRAINING PROGRAM

## 2025-05-07 PROCEDURE — G0439 PPPS, SUBSEQ VISIT: HCPCS | Performed by: STUDENT IN AN ORGANIZED HEALTH CARE EDUCATION/TRAINING PROGRAM

## 2025-05-07 PROCEDURE — 3074F SYST BP LT 130 MM HG: CPT | Performed by: STUDENT IN AN ORGANIZED HEALTH CARE EDUCATION/TRAINING PROGRAM

## 2025-05-07 PROCEDURE — 90471 IMMUNIZATION ADMIN: CPT | Performed by: STUDENT IN AN ORGANIZED HEALTH CARE EDUCATION/TRAINING PROGRAM

## 2025-05-07 PROCEDURE — 99396 PREV VISIT EST AGE 40-64: CPT | Performed by: STUDENT IN AN ORGANIZED HEALTH CARE EDUCATION/TRAINING PROGRAM

## 2025-05-07 PROCEDURE — 99214 OFFICE O/P EST MOD 30 MIN: CPT | Performed by: STUDENT IN AN ORGANIZED HEALTH CARE EDUCATION/TRAINING PROGRAM

## 2025-05-07 PROCEDURE — 69210 REMOVE IMPACTED EAR WAX UNI: CPT | Performed by: STUDENT IN AN ORGANIZED HEALTH CARE EDUCATION/TRAINING PROGRAM

## 2025-05-07 PROCEDURE — 3008F BODY MASS INDEX DOCD: CPT | Performed by: STUDENT IN AN ORGANIZED HEALTH CARE EDUCATION/TRAINING PROGRAM

## 2025-05-07 PROCEDURE — 4010F ACE/ARB THERAPY RXD/TAKEN: CPT | Performed by: STUDENT IN AN ORGANIZED HEALTH CARE EDUCATION/TRAINING PROGRAM

## 2025-05-07 PROCEDURE — 3078F DIAST BP <80 MM HG: CPT | Performed by: STUDENT IN AN ORGANIZED HEALTH CARE EDUCATION/TRAINING PROGRAM

## 2025-05-07 PROCEDURE — 1036F TOBACCO NON-USER: CPT | Performed by: STUDENT IN AN ORGANIZED HEALTH CARE EDUCATION/TRAINING PROGRAM

## 2025-05-07 RX ORDER — METFORMIN HYDROCHLORIDE 500 MG/1
1000 TABLET, EXTENDED RELEASE ORAL
Qty: 180 TABLET | Refills: 3 | Status: SHIPPED | OUTPATIENT
Start: 2025-05-07 | End: 2026-06-11

## 2025-05-07 ASSESSMENT — ENCOUNTER SYMPTOMS
HEADACHES: 0
COUGH: 0
SHORTNESS OF BREATH: 0
RHINORRHEA: 0
DYSURIA: 0
DYSPHORIC MOOD: 0
ARTHRALGIAS: 0
NERVOUS/ANXIOUS: 0
UNEXPECTED WEIGHT CHANGE: 0
DIARRHEA: 0
FATIGUE: 0
ABDOMINAL PAIN: 0
BLOOD IN STOOL: 0
CONSTIPATION: 0
DIZZINESS: 0
LIGHT-HEADEDNESS: 0
FEVER: 0
PALPITATIONS: 0
NAUSEA: 0
CHILLS: 0
VOMITING: 0
HEMATURIA: 0

## 2025-05-07 ASSESSMENT — PATIENT HEALTH QUESTIONNAIRE - PHQ9
SUM OF ALL RESPONSES TO PHQ9 QUESTIONS 1 & 2: 0
1. LITTLE INTEREST OR PLEASURE IN DOING THINGS: NOT AT ALL
SUM OF ALL RESPONSES TO PHQ9 QUESTIONS 1 AND 2: 0
1. LITTLE INTEREST OR PLEASURE IN DOING THINGS: NOT AT ALL
2. FEELING DOWN, DEPRESSED OR HOPELESS: NOT AT ALL
2. FEELING DOWN, DEPRESSED OR HOPELESS: NOT AT ALL

## 2025-05-07 ASSESSMENT — ACTIVITIES OF DAILY LIVING (ADL)
GROCERY_SHOPPING: INDEPENDENT
DRESSING: INDEPENDENT
BATHING: INDEPENDENT
DOING_HOUSEWORK: INDEPENDENT
MANAGING_FINANCES: INDEPENDENT
TAKING_MEDICATION: INDEPENDENT

## 2025-05-07 NOTE — ASSESSMENT & PLAN NOTE
Orders:    metFORMIN XR (Glucophage-XR) 500 mg 24 hr tablet; Take 2 tablets (1,000 mg) by mouth once daily in the evening. Take with meals. Do not crush, chew, or split.    Albumin-Creatinine Ratio, Urine Random; Future

## 2025-05-28 ENCOUNTER — APPOINTMENT (OUTPATIENT)
Dept: OPHTHALMOLOGY | Facility: CLINIC | Age: 65
End: 2025-05-28
Payer: COMMERCIAL

## 2025-06-19 LAB
ALBUMIN/CREAT UR: 26 MG/G CREAT
CREAT UR-MCNC: 108 MG/DL (ref 20–320)
ERYTHROCYTE [DISTWIDTH] IN BLOOD BY AUTOMATED COUNT: 18.9 % (ref 11–15)
HCT VFR BLD AUTO: 52.7 % (ref 38.5–50)
HGB BLD-MCNC: 16.8 G/DL (ref 13.2–17.1)
MCH RBC QN AUTO: 24 PG (ref 27–33)
MCHC RBC AUTO-ENTMCNC: 31.9 G/DL (ref 32–36)
MCV RBC AUTO: 75.4 FL (ref 80–100)
MICROALBUMIN UR-MCNC: 2.8 MG/DL
PLATELET # BLD AUTO: 182 THOUSAND/UL (ref 140–400)
PMV BLD REES-ECKER: 9.7 FL (ref 7.5–12.5)
RBC # BLD AUTO: 6.99 MILLION/UL (ref 4.2–5.8)
WBC # BLD AUTO: 5 THOUSAND/UL (ref 3.8–10.8)

## 2025-06-23 ENCOUNTER — TELEPHONE (OUTPATIENT)
Dept: PRIMARY CARE | Facility: CLINIC | Age: 65
End: 2025-06-23
Payer: COMMERCIAL

## 2025-06-23 NOTE — TELEPHONE ENCOUNTER
----- Message from Tong Umana sent at 6/19/2025 12:12 PM EDT -----  CBC shows improved hemoglobin levels. Albumin/creatinine ratio is normal  ----- Message -----  From: [facility] Results In  Sent: 6/19/2025   6:26 AM EDT  To: Tong Umana MD

## 2025-06-23 NOTE — TELEPHONE ENCOUNTER
Called and spoke to patient regarding results. Verbalized understanding. No questions or concerns at this time.

## 2025-08-07 ENCOUNTER — APPOINTMENT (OUTPATIENT)
Dept: PRIMARY CARE | Facility: CLINIC | Age: 65
End: 2025-08-07
Payer: COMMERCIAL

## 2025-08-07 VITALS
TEMPERATURE: 98.2 F | OXYGEN SATURATION: 98 % | SYSTOLIC BLOOD PRESSURE: 130 MMHG | HEART RATE: 86 BPM | DIASTOLIC BLOOD PRESSURE: 80 MMHG | BODY MASS INDEX: 35.44 KG/M2 | WEIGHT: 228 LBS

## 2025-08-07 DIAGNOSIS — E11.9 TYPE 2 DIABETES MELLITUS WITHOUT COMPLICATION, WITHOUT LONG-TERM CURRENT USE OF INSULIN: Primary | ICD-10-CM

## 2025-08-07 LAB — POC HEMOGLOBIN A1C: 6.9 % (ref 4.2–6.5)

## 2025-08-07 PROCEDURE — 3079F DIAST BP 80-89 MM HG: CPT | Performed by: STUDENT IN AN ORGANIZED HEALTH CARE EDUCATION/TRAINING PROGRAM

## 2025-08-07 PROCEDURE — G2211 COMPLEX E/M VISIT ADD ON: HCPCS | Performed by: STUDENT IN AN ORGANIZED HEALTH CARE EDUCATION/TRAINING PROGRAM

## 2025-08-07 PROCEDURE — 3075F SYST BP GE 130 - 139MM HG: CPT | Performed by: STUDENT IN AN ORGANIZED HEALTH CARE EDUCATION/TRAINING PROGRAM

## 2025-08-07 PROCEDURE — 3044F HG A1C LEVEL LT 7.0%: CPT | Performed by: STUDENT IN AN ORGANIZED HEALTH CARE EDUCATION/TRAINING PROGRAM

## 2025-08-07 PROCEDURE — 4010F ACE/ARB THERAPY RXD/TAKEN: CPT | Performed by: STUDENT IN AN ORGANIZED HEALTH CARE EDUCATION/TRAINING PROGRAM

## 2025-08-07 PROCEDURE — 1036F TOBACCO NON-USER: CPT | Performed by: STUDENT IN AN ORGANIZED HEALTH CARE EDUCATION/TRAINING PROGRAM

## 2025-08-07 PROCEDURE — 83036 HEMOGLOBIN GLYCOSYLATED A1C: CPT | Performed by: STUDENT IN AN ORGANIZED HEALTH CARE EDUCATION/TRAINING PROGRAM

## 2025-08-07 PROCEDURE — 99214 OFFICE O/P EST MOD 30 MIN: CPT | Performed by: STUDENT IN AN ORGANIZED HEALTH CARE EDUCATION/TRAINING PROGRAM

## 2025-08-07 RX ORDER — TIRZEPATIDE 2.5 MG/.5ML
2.5 INJECTION, SOLUTION SUBCUTANEOUS WEEKLY
Qty: 2 ML | Refills: 1 | Status: SHIPPED | OUTPATIENT
Start: 2025-08-07

## 2025-08-07 NOTE — ASSESSMENT & PLAN NOTE
Orders:    POCT glycosylated hemoglobin (Hb A1C) manually resulted    tirzepatide (Mounjaro) 2.5 mg/0.5 mL pen injector; Inject 2.5 mg under the skin 1 (one) time per week.

## 2025-08-07 NOTE — PROGRESS NOTES
"  Assessment/Plan   Assessment & Plan  Type 2 diabetes mellitus without complication, without long-term current use of insulin    Orders:    POCT glycosylated hemoglobin (Hb A1C) manually resulted    tirzepatide (Mounjaro) 2.5 mg/0.5 mL pen injector; Inject 2.5 mg under the skin 1 (one) time per week.        Subjective   Patient ID: Germain Pichardo \"Greg" is a 64 y.o. male who presents for Follow-up (diabetes).  HPI      BG at home has been around 120, denies hypoglycemic events. A1C was 6.9% today  Denies side effects due to his medication.   He is currently on metformin xr 1000 mg every day and jardiance 10 mg every day  Discussed benefits and risks of addition of mounjaro including nausea, vomiting, constipation, medullary thyroid cancer, gall bladder disease and pancreatitits    Objective   Physical Exam  Constitutional:       Appearance: Normal appearance.   HENT:      Head: Normocephalic and atraumatic.     Cardiovascular:      Rate and Rhythm: Normal rate and regular rhythm.     Neurological:      Mental Status: He is alert.               Tong Umana MD 08/07/25 7:39 AM   "

## 2025-08-11 ENCOUNTER — APPOINTMENT (OUTPATIENT)
Facility: CLINIC | Age: 65
End: 2025-08-11
Payer: COMMERCIAL

## 2025-08-11 VITALS — BODY MASS INDEX: 33.95 KG/M2 | WEIGHT: 224 LBS | HEART RATE: 89 BPM | HEIGHT: 68 IN

## 2025-08-11 DIAGNOSIS — R14.2 BURPING: Primary | ICD-10-CM

## 2025-08-11 DIAGNOSIS — R10.13 DYSPEPSIA: ICD-10-CM

## 2025-08-11 PROCEDURE — 99204 OFFICE O/P NEW MOD 45 MIN: CPT | Performed by: INTERNAL MEDICINE

## 2025-08-11 PROCEDURE — 3008F BODY MASS INDEX DOCD: CPT | Performed by: INTERNAL MEDICINE

## 2025-08-11 PROCEDURE — 4010F ACE/ARB THERAPY RXD/TAKEN: CPT | Performed by: INTERNAL MEDICINE

## 2025-08-11 PROCEDURE — 3044F HG A1C LEVEL LT 7.0%: CPT | Performed by: INTERNAL MEDICINE

## 2025-08-11 PROCEDURE — 1036F TOBACCO NON-USER: CPT | Performed by: INTERNAL MEDICINE

## 2025-08-11 ASSESSMENT — ENCOUNTER SYMPTOMS: SHORTNESS OF BREATH: 0

## 2025-08-14 ENCOUNTER — APPOINTMENT (OUTPATIENT)
Dept: OPHTHALMOLOGY | Facility: CLINIC | Age: 65
End: 2025-08-14
Payer: COMMERCIAL

## 2025-08-14 DIAGNOSIS — H52.223 REGULAR ASTIGMATISM OF BOTH EYES: ICD-10-CM

## 2025-08-14 DIAGNOSIS — Z83.511 FAMILY HISTORY OF GLAUCOMA: ICD-10-CM

## 2025-08-14 DIAGNOSIS — H25.13 AGE-RELATED NUCLEAR CATARACT OF BOTH EYES: ICD-10-CM

## 2025-08-14 DIAGNOSIS — H52.4 PRESBYOPIA: ICD-10-CM

## 2025-08-14 DIAGNOSIS — E11.9 TYPE 2 DIABETES MELLITUS WITHOUT COMPLICATION, WITHOUT LONG-TERM CURRENT USE OF INSULIN: Primary | ICD-10-CM

## 2025-08-14 DIAGNOSIS — H52.03 HYPEROPIA OF BOTH EYES: ICD-10-CM

## 2025-08-14 PROCEDURE — 92004 COMPRE OPH EXAM NEW PT 1/>: CPT | Performed by: OPHTHALMOLOGY

## 2025-08-14 ASSESSMENT — TONOMETRY
OD_IOP_MMHG: 16
IOP_METHOD: GOLDMANN APPLANATION

## 2025-08-14 ASSESSMENT — EXTERNAL EXAM - LEFT EYE: OS_EXAM: NORMAL

## 2025-08-14 ASSESSMENT — VISUAL ACUITY
OS_CC: 20/20
METHOD: SNELLEN - LINEAR
OD_CC: 20/20-1

## 2025-08-14 ASSESSMENT — CUP TO DISC RATIO
OS_RATIO: 0.4
OD_RATIO: 0.4

## 2025-08-14 ASSESSMENT — EXTERNAL EXAM - RIGHT EYE: OD_EXAM: NORMAL

## 2025-08-14 ASSESSMENT — REFRACTION_WEARINGRX
OS_ADD: +2.25
OD_ADD: +2.25
OD_AXIS: 084
OD_SPHERE: +1.50
OS_AXIS: 104
OS_SPHERE: +1.50
OD_CYLINDER: -1.00
OS_CYLINDER: -1.25

## 2025-08-14 ASSESSMENT — SLIT LAMP EXAM - LIDS
COMMENTS: NORMAL
COMMENTS: NORMAL

## 2025-08-14 ASSESSMENT — ENCOUNTER SYMPTOMS: EYES NEGATIVE: 1

## 2025-09-02 ENCOUNTER — APPOINTMENT (OUTPATIENT)
Dept: GASTROENTEROLOGY | Facility: EXTERNAL LOCATION | Age: 65
End: 2025-09-02
Payer: COMMERCIAL

## 2025-11-06 ENCOUNTER — APPOINTMENT (OUTPATIENT)
Dept: PRIMARY CARE | Facility: CLINIC | Age: 65
End: 2025-11-06
Payer: COMMERCIAL

## 2026-03-05 ENCOUNTER — APPOINTMENT (OUTPATIENT)
Dept: CARDIOLOGY | Facility: CLINIC | Age: 66
End: 2026-03-05
Payer: MEDICARE

## 2026-08-18 ENCOUNTER — APPOINTMENT (OUTPATIENT)
Dept: OPHTHALMOLOGY | Facility: CLINIC | Age: 66
End: 2026-08-18
Payer: COMMERCIAL